# Patient Record
Sex: MALE | Race: WHITE | NOT HISPANIC OR LATINO | Employment: OTHER | URBAN - METROPOLITAN AREA
[De-identification: names, ages, dates, MRNs, and addresses within clinical notes are randomized per-mention and may not be internally consistent; named-entity substitution may affect disease eponyms.]

---

## 2021-04-28 ENCOUNTER — HISTORICAL (OUTPATIENT)
Dept: ADMINISTRATIVE | Facility: HOSPITAL | Age: 76
End: 2021-04-28

## 2021-04-28 LAB
RET# (OHS): 0.07 X10^6/ML (ref 0.03–0.1)
RETICULOCYTE COUNT AUTOMATED (OLG): 1.8 % (ref 1.1–2.1)

## 2021-06-01 ENCOUNTER — HISTORICAL (OUTPATIENT)
Dept: ADMINISTRATIVE | Facility: HOSPITAL | Age: 76
End: 2021-06-01

## 2021-06-01 LAB
RET# (OHS): 0.05 X10^6/ML (ref 0.03–0.1)
RETICULOCYTE COUNT AUTOMATED (OLG): 1.3 % (ref 1.1–2.1)

## 2021-08-02 LAB — CRC RECOMMENDATION EXT: NORMAL

## 2022-01-20 ENCOUNTER — HISTORICAL (OUTPATIENT)
Dept: ADMINISTRATIVE | Facility: HOSPITAL | Age: 77
End: 2022-01-20

## 2022-01-26 ENCOUNTER — HISTORICAL (OUTPATIENT)
Dept: RADIOLOGY | Facility: HOSPITAL | Age: 77
End: 2022-01-26

## 2022-01-31 ENCOUNTER — HISTORICAL (OUTPATIENT)
Dept: RADIOLOGY | Facility: HOSPITAL | Age: 77
End: 2022-01-31

## 2022-01-31 ENCOUNTER — HISTORICAL (OUTPATIENT)
Dept: ADMINISTRATIVE | Facility: HOSPITAL | Age: 77
End: 2022-01-31

## 2022-04-10 ENCOUNTER — HISTORICAL (OUTPATIENT)
Dept: ADMINISTRATIVE | Facility: HOSPITAL | Age: 77
End: 2022-04-10
Payer: MEDICARE

## 2022-04-26 VITALS
OXYGEN SATURATION: 98 % | DIASTOLIC BLOOD PRESSURE: 83 MMHG | WEIGHT: 163.38 LBS | SYSTOLIC BLOOD PRESSURE: 172 MMHG | HEIGHT: 66 IN | BODY MASS INDEX: 26.26 KG/M2

## 2022-06-21 ENCOUNTER — TELEPHONE (OUTPATIENT)
Dept: ORTHOPEDICS | Facility: CLINIC | Age: 77
End: 2022-06-21
Payer: MEDICARE

## 2022-06-21 NOTE — TELEPHONE ENCOUNTER
Spoke to pt and stated  will be out due to a procedure so September is her soonest appt. Pt fell off roof in 2015 had compound fx and had sx, hurts any time he applies pressure. Informed pt to please have his operative note from sx faxed over or bring to appt, pt stated he had xrays done only no CT or MRI. Pt verbalized understanding and is ok with waiting to see .----- Message from Rhina Gibson sent at 6/21/2022  2:20 PM CDT -----  Regarding: Appt  Contact: pt @ [phone]  Pt has appt in Sept, need to be seen asap, had ankle surgery in past, still not able to put pressure on ankle, very painful. Asking for a call back

## 2022-08-24 ENCOUNTER — TELEPHONE (OUTPATIENT)
Dept: ORTHOPEDICS | Facility: CLINIC | Age: 77
End: 2022-08-24
Payer: MEDICARE

## 2022-09-28 DIAGNOSIS — R52 PAIN: Primary | ICD-10-CM

## 2022-10-04 ENCOUNTER — HOSPITAL ENCOUNTER (OUTPATIENT)
Dept: RADIOLOGY | Facility: HOSPITAL | Age: 77
Discharge: HOME | End: 2022-10-04
Attending: ORTHOPAEDIC SURGERY
Payer: MEDICARE

## 2022-10-04 ENCOUNTER — OFFICE VISIT (OUTPATIENT)
Dept: ORTHOPEDICS | Facility: CLINIC | Age: 77
End: 2022-10-04
Payer: MEDICARE

## 2022-10-04 VITALS — BODY MASS INDEX: 27.27 KG/M2 | WEIGHT: 163.69 LBS | HEIGHT: 65 IN

## 2022-10-04 DIAGNOSIS — M19.171 POST-TRAUMATIC OSTEOARTHRITIS OF RIGHT ANKLE: Primary | ICD-10-CM

## 2022-10-04 DIAGNOSIS — Z97.8 ORTHOPEDIC HARDWARE PRESENT: ICD-10-CM

## 2022-10-04 DIAGNOSIS — R52 PAIN: ICD-10-CM

## 2022-10-04 DIAGNOSIS — M79.671 RIGHT FOOT PAIN: ICD-10-CM

## 2022-10-04 DIAGNOSIS — S82.891S OPEN ANKLE FRACTURE, RIGHT, SEQUELA: ICD-10-CM

## 2022-10-04 PROBLEM — M19.071 ARTHRITIS OF RIGHT ANKLE: Status: ACTIVE | Noted: 2022-10-04

## 2022-10-04 PROCEDURE — 99999 PR PBB SHADOW E&M-EST. PATIENT-LVL III: CPT | Mod: PBBFAC,,, | Performed by: ORTHOPAEDIC SURGERY

## 2022-10-04 PROCEDURE — 73610 XR ANKLE COMPLETE 3 VIEW RIGHT: ICD-10-PCS | Mod: 26,RT,, | Performed by: RADIOLOGY

## 2022-10-04 PROCEDURE — 99204 PR OFFICE/OUTPT VISIT, NEW, LEVL IV, 45-59 MIN: ICD-10-PCS | Mod: S$PBB,,, | Performed by: ORTHOPAEDIC SURGERY

## 2022-10-04 PROCEDURE — 99999 PR PBB SHADOW E&M-EST. PATIENT-LVL III: ICD-10-PCS | Mod: PBBFAC,,, | Performed by: ORTHOPAEDIC SURGERY

## 2022-10-04 PROCEDURE — 99204 OFFICE O/P NEW MOD 45 MIN: CPT | Mod: S$PBB,,, | Performed by: ORTHOPAEDIC SURGERY

## 2022-10-04 PROCEDURE — 73630 XR FOOT COMPLETE 3 VIEW RIGHT: ICD-10-PCS | Mod: 26,RT,, | Performed by: RADIOLOGY

## 2022-10-04 PROCEDURE — 73610 X-RAY EXAM OF ANKLE: CPT | Mod: 26,RT,, | Performed by: RADIOLOGY

## 2022-10-04 PROCEDURE — 73610 X-RAY EXAM OF ANKLE: CPT | Mod: TC,PO,RT

## 2022-10-04 PROCEDURE — 99213 OFFICE O/P EST LOW 20 MIN: CPT | Mod: PBBFAC,PO | Performed by: ORTHOPAEDIC SURGERY

## 2022-10-04 PROCEDURE — 73630 X-RAY EXAM OF FOOT: CPT | Mod: TC,PO,RT

## 2022-10-04 PROCEDURE — 73630 X-RAY EXAM OF FOOT: CPT | Mod: 26,RT,, | Performed by: RADIOLOGY

## 2022-10-04 RX ORDER — LEVOTHYROXINE SODIUM 100 UG/1
100 TABLET ORAL
COMMUNITY
End: 2023-10-05 | Stop reason: SDUPTHER

## 2022-10-04 RX ORDER — ATORVASTATIN CALCIUM 20 MG/1
20 TABLET, FILM COATED ORAL DAILY
COMMUNITY
Start: 2022-01-20 | End: 2023-10-05

## 2022-10-04 RX ORDER — AMLODIPINE BESYLATE 5 MG/1
5 TABLET ORAL DAILY
COMMUNITY
Start: 2022-01-20 | End: 2023-10-05 | Stop reason: SDUPTHER

## 2022-10-04 RX ORDER — METFORMIN HYDROCHLORIDE 500 MG/1
500 TABLET ORAL EVERY MORNING
COMMUNITY
Start: 2022-01-20 | End: 2023-10-05 | Stop reason: SDUPTHER

## 2022-10-04 NOTE — PROGRESS NOTES
Hgb A1c 7.1 - patient will need to be counseled cutoff for ankle replacement surgery is <7.  Will need to work with PCP on glycemic control optimization.

## 2022-10-04 NOTE — LETTER
October 4, 2022        Carlos Hutchison Sr., MD  [address]  [place] [address]             [facility]  [address] [place] [address].  [place] [address]  Phone: [phone]   Patient: Jose Cohen   MR Number: [id number]   YOB: 1945   Date of Visit: 10/4/2022       Dear Dr. Hutchison:    I saw your patient, Jose Cohen, today for evaluation. Attached you will find relevant portions of my assessment and plan of care.    Here is some information:  [email or web address]    If you have questions, please do not hesitate to call me. I look forward to following Jose Cohen along with you.    Sincerely,      Natali Cancino MD            CC  No Recipients    Enclosure

## 2022-10-04 NOTE — PATIENT INSTRUCTIONS
"Today, you saw Dr. 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  We discussed options for treatment today include medications, bracing, and surgery.  Surgery options are ankle replacement vs ankle fusion.  We discussed both options and I encourage you to continue research at home.      Benefits of fusion include pain relief and durability.  A fusion cannot "wear out" and you have no permanent restrictions after a fusion heals.  Risks of fusion relates to the bone healing and the long term risk involves stress referred to other joints in the foot which can start to wear out and hurt.  There is also the obvious loss of ankle motion.  However, motion in our "ankle" comes from more places than just the ankle joint and people walk more normal than you would think (https://www.youSocialDiabetes.com/watch?v=zsXPtptmPNM).      Benefits of replacement include pain relief and preserved motion.  Replacement is gaining in popularity and use as great strides have been made in the techniques and the replacement parts available.  Risks of replacement relates to wound healing and infection and the components healing to the bone.  Unique to replacement is the risk of it wearing out - the metal and plastic parts that replace the joint can may not last your lifetime.  For that reason, after a replacement there are certain guidelines I encourage patients to follow to protect/preserve their ankle replacement (avoiding heavy laboring, avoiding running and other higher impact sports).  The younger you are when you have a replacement, the more there is the risk that you outlive the components that were placed.  In that case, you might need another surgery to tweak things or even replace the replacement or convert to a fusion.   This is still an area where our knowledge and experience is growing which is why ankle replacement should be delayed as long as possible.    You can learn more about these options at these " "websites:  --https://blog.[employer].org/articles/ankle-replacement-or-fusion-which-procedure-is-[email or web address]-you  --www.[employer]  --[email or web address]://[email or web address]-[email or web address]  --[email or web address]://[email or web address]-[email or web address]/total-[email or web address]-replacement    We discussed options for treatment of ankle arthritis is/are:  RICE guidelines (see below)  Anti-inflammatory medications (see below)  Therapy: I do not routinely recommend therapy as part of the treatment of arthritis.  I rather recommend you stay active and participate in low impact activities (biking, walking, swimming).  Activity: Use pain as your guide, advance your activities as tolerated   Bracing: none  Injections: Right ankle injections     Thank you for allowing me to participate in your care.      How to treat inflammation?  1.) What does your doctor mean when they tell you to follow R.I.C.E. Guidelines?    Rest your ankle/foot by limiting activities that cause pain.  A good indicator of activity level is your pain the night following the activity and the day after.  If you have pain that lasts until the next day, you did too much.  Ice it to keep the swelling down. Don't put ice directly on the skin (use a thin piece of cloth between the ice bag and skin) and don't ice more than 20 minutes at a time to avoid frostbite.  Compressive bandages immobilize and support your injury.  Make sure it isn't too tight - your toes should not be turning purple and the wrap should not hurt.  Elevate your ankle above your heart level - "toes above your nose". This applies to acute injuries and should be followed for first 48 hours as well as afterward when you have increased pain and swelling after activity or therapy.    2.) Another common way of treating pain and inflammation from an injury is anti-inflammatory medications.  Dr. Cancino may prescribe you a medication for inflammation or you can take an over-the-counter anti-inflammatory " (also known as NSAIDs - nonsteroidal anti-inflammatory drugs).  These include such medications as aleve, motrin, ibuprofen, naproxen, etc.  They should be taken as prescribed or according to the over-the-counter packaging instructions.  These medications can upset the stomach or rare cases causes ulcer disease or kidney injury.  If you are concerned about using these medications long-term, you should discuss it with you primary doctor.  You can also combine or substitute an NSAID with acetaminophen (commonly known as Tylenol).  Take according to bottle instructions, and you can take up to 3000 mg daily (important to keep in mind if you take other medications that contain acetaminophen).  Again long term use should be discussed with your primary doctor.

## 2022-10-05 ENCOUNTER — PATIENT MESSAGE (OUTPATIENT)
Dept: ORTHOPEDICS | Facility: CLINIC | Age: 77
End: 2022-10-05
Payer: MEDICARE

## 2022-10-06 DIAGNOSIS — E11.9 TYPE 2 DIABETES MELLITUS WITHOUT COMPLICATION, WITHOUT LONG-TERM CURRENT USE OF INSULIN: Primary | ICD-10-CM

## 2022-10-13 ENCOUNTER — CLINICAL SUPPORT (OUTPATIENT)
Dept: DIABETES | Facility: CLINIC | Age: 77
End: 2022-10-13
Payer: MEDICARE

## 2022-10-13 VITALS — WEIGHT: 161.69 LBS | BODY MASS INDEX: 26.91 KG/M2

## 2022-10-13 DIAGNOSIS — E11.9 TYPE 2 DIABETES MELLITUS WITHOUT COMPLICATION, WITHOUT LONG-TERM CURRENT USE OF INSULIN: ICD-10-CM

## 2022-10-13 PROCEDURE — G0108 PR DIAB MANAGE TRN  PER INDIV: ICD-10-PCS | Mod: ,,, | Performed by: INTERNAL MEDICINE

## 2022-10-13 PROCEDURE — G0108 DIAB MANAGE TRN  PER INDIV: HCPCS | Mod: ,,, | Performed by: INTERNAL MEDICINE

## 2022-10-13 NOTE — PROGRESS NOTES
Diabetes Care Specialist Progress Note  Author: Amie Blankenship RD  Date: 10/13/2022    Program Intake  Reason for Diabetes Program Visit:: Initial Diabetes Assessment  Current diabetes risk level:: low  In the last 12 months, have you:: none  Permission to speak with others about care:: yes    No results found for: LABA1C, HGBA1C    Clinical    Patient Health Rating  Compared to other people your age, how would you rate your health?: Good    Problem Review  Reviewed health maintenance: yes    Clinical Assessment  Current Diabetes Treatment: Oral Medication (500mg Metformin BID)  Have you ever experienced hypoglycemia (low blood sugar)?: no  Have you ever experienced hyperglycemia (high blood sugar)?: no    Medication Information  How do you obtain your medications?: Family picks up  How many days a week do you miss your medications?: Never  Do you sometimes have difficulty refilling your medications?: No  Medication adherence impacting ability to self-manage diabetes?: No    Labs  Do you have regular lab work to monitor your medications?: Yes  Where do you get your labs drawn?: Other  Lab Compliance Barriers: No    Nutritional Status  Diet: Regular  Meal Plan 24 Hour Recall:  (see care plan)  Change in appetite?: No  Dentation:: Wears Dentures  Recent Changes in Weight: No Recent Weight Change  Current nutritional status an area of need that is impacting patient's ability to self-manage diabetes?: Yes    Additional Social History    Support  Does anyone support you with your diabetes care?: yes  Who supports you?: spouse  Who takes you to your medical appointments?: spouse  Does the current support meet the patient's needs?: Yes  Is Support an area impacting ability to self-manage diabetes?: No         Cognitive/Behavioral Health  Alert and Oriented: Yes  Difficulty Thinking: No  Requires Prompting: No  Requires assistance for routine expression?: No  Cognitive or behavioral barriers impacting ability to self-manage  diabetes?: No    Culture/Samaritan  Culture or Religion beliefs that may impact ability to access healthcare: No    Communication  Language preference: English  Hearing Problems: Yes  Hearing Assistance: Hearing aid  Vision Problems: No  Communication needs impacting ability to self-manage diabetes?: No    Health Literacy  Preferred Learning Method: Face to Face  How often do you need to have someone help you read instructions, pamphlets, or written material from your doctor or pharmacy?: Rarely  Health literacy needs impacting ability to self-manage diabetes?: No      Diabetes Self-Management Skills Assessment    Diabetes Disease Process/Treatment Options  Patient/caregiver able to state what happens when someone has diabetes.: somewhat  Patient/caregiver knows what type of diabetes they have.: yes  Diabetes Type : Type II  Patient able to identify at least three risk factors for diabetes.: yes  Identified risk factors:: age over 40, family history, reduced activity  Diabetes Disease Process/Treatment Options: Skills Assessment Completed: Yes  Assessment indicates:: Adequate understanding  Area of need?: No    Nutrition/Healthy Eating  Method of carbohydrate measurement:: no method  Patient can identify foods that impact blood sugar.: yes  Nutrition/Healthy Eating Skills Assessment Completed:: Yes  Assessment indicates:: Instruction Needed  Area of need?: Yes    Physical Activity/Exercise  Physical Activity/Exercise Skills Assessment Completed: : No  Deffered due to:: Time  Area of need?: Yes    Medications  Patient is able to describe current diabetes management routine.: yes  Diabetes management routine:: oral medications (500mg Metformin BID)  Patient is able to identify current diabetes medications, dosages, and appropriate timing of medications.: yes  Patient understands the purpose of the medications taken for diabetes.: yes  Patient reports problems or concerns with current medication regimen.:  no  Medication Skills Assessment Completed:: Yes  Assessment indicates:: Adequate understanding  Area of need?: No    Home Blood Glucose Monitoring  Patient states that blood sugar is checked at home daily.: yes  Monitoring Method:: home glucometer  How often do you check your blood sugar?: Occasionally  When do you check your blood sugar?: Before breakfast  Blood glucose logs:: no  Blood glucose logs reviewed today?: no  Home Blood Glucose Monitoring Skills Assessment Completed: : Yes  Assessment indicates:: Instruction Needed  Area of need?: Yes    Acute Complications  Acute Complications Skills Assessment Completed: : No  Deffered due to:: Time  Area of need?: Yes    Chronic Complications  Chronic Complications Skills Assessment Completed: : No  Deferred due to:: Time  Area of need?: Yes    Psychosocial/Coping  Patient can identify ways of coping with chronic disease.: yes  Psychosocial/Coping Skills Assessment Completed: : Yes  Area of need?: No      Diabetes Self Support Plan         Assessment Summary and Plan    Based on today's diabetes care assessment, the following areas of need were identified:      Social 10/13/2022   Support No   Cognitive/Behavioral Health No   Culture/Alevism No   Communication No   Health Literacy No        Clinical 10/13/2022   Medication Adherence No   Lab Compliance No   Nutritional Status Yes        Diabetes Self-Management Skills 10/13/2022   Diabetes Disease Process/Treatment Options No   Nutrition/Healthy Eating Yes - see care plan   Physical Activity/Exercise Yes - follow up   Medication No   Home Blood Glucose Monitoring Yes - see care plan   Acute Complications Yes - follow up   Chronic Complications Yes - follow up   Psychosocial/Coping No          Today's interventions were provided through individual discussion, instruction, and written materials were provided.      Patient verbalized understanding of instruction and written materials.  Pt was able to return back  demonstration of instructions today. Patient understood key points, needs reinforcement and further instruction.     Diabetes Self-Management Care Plan:    Today's Diabetes Self-Management Care Plan was developed with Jose's input. Jose has agreed to work toward the following goal(s) to improve his/her overall diabetes control.      Care Plan: Diabetes Management   Updates made since 9/13/2022 12:00 AM        Problem: Healthy Eating         Goal: No more than 30g carbs/meal, 15 g carbs/snack    Start Date: 10/13/2022   Expected End Date: 10/20/2022   This Visit's Progress: On track   Priority: High   Barriers: No Barriers Identified   Note:    Pt current A1C 7.1%.  His surgeon, Dr Cancino, will not perform ankle replacement surgery until <7%.    Before learning this, food recall indicated 3 meals with HS snack daily.  B: 3 or 4 pancakes with syrup, L: sandwich, D: prepared meal.  Evening snack of sweets.  Was drinking lemonade and powerade.  Has been changing his diet for the last week.  B: 2 small pancakes, L: large salad with protein, D: prepared meal.  Has cut out lemonade.  Still some evening snacking and powerade.    Educated pt and wife on carb counting with goal consistency at meals.  No more than 30 g carbs/meal, 15 g carbs/snack with lean protein at each.  Do not drink anything with carbs!       Task: Reviewed the sources and role of Carbohydrate, Protein, and Fat and how each nutrient impacts blood sugar. Completed 10/13/2022        Task: Provided visual examples using dry measuring cups, food models, and other familiar objects such as computer mouse, deck or cards, tennis ball etc. to help with visualization of portions. Completed 10/13/2022        Task: Explained how to count carbohydrates using the food label and the use of dry measuring cups for accurate carb counting. Completed 10/13/2022       Task: Recommended replacing beverages containing high sugar content with noncaloric/sugar free options  and/or water. Completed 10/13/2022       Problem: Blood Glucose Self-Monitoring         Goal: Patient agrees to check and record blood sugars 1  time per day.    Start Date: 10/13/2022   Expected End Date: 10/20/2022   This Visit's Progress: Not met   Priority: Medium   Barriers: No Barriers Identified   Note:    Pt has not been checking BG regularly.  Rec he test fasting BG daily.  BG log and goal ranges provided.                Follow Up Plan     Follow up in about 1 week (around 10/20/2022).    Pt and wife present for appt.  Pt met with HCP on 10/3: A1c 7.1%.  Met with Dr Bar (surgeon to perform ankle replacement) on 10/4; she will not perform surgery until A1C <7%.  Rec pt wife call HCP to make them aware of this.  Very motivated to make changes.  Would like to follow up with me weekly.  This week, focus on diet and BG monitoring.  Will review log at follow up and discuss exercises that can be done from seated position.     Today's care plan and follow up schedule was discussed with patient.  Jose verbalized understanding of the care plan, goals, and agrees to follow up plan.        The patient was encouraged to communicate with his/her health care provider/physician and care team regarding his/her condition(s) and treatment.  I provided the patient with my contact information today and encouraged to contact me via phone or [facility]'s Patient Portal as needed.     Length of Visit   Total Time: 60 Minutes

## 2022-10-20 ENCOUNTER — NUTRITION (OUTPATIENT)
Dept: DIABETES | Facility: CLINIC | Age: 77
End: 2022-10-20
Payer: MEDICARE

## 2022-10-20 VITALS — BODY MASS INDEX: 26.64 KG/M2 | WEIGHT: 160.13 LBS

## 2022-10-20 DIAGNOSIS — E11.9 TYPE 2 DIABETES MELLITUS WITHOUT COMPLICATION, WITHOUT LONG-TERM CURRENT USE OF INSULIN: Primary | ICD-10-CM

## 2022-10-20 PROCEDURE — G0108 PR DIAB MANAGE TRN  PER INDIV: ICD-10-PCS | Mod: ,,, | Performed by: INTERNAL MEDICINE

## 2022-10-20 PROCEDURE — G0108 DIAB MANAGE TRN  PER INDIV: HCPCS | Mod: ,,, | Performed by: INTERNAL MEDICINE

## 2022-10-20 NOTE — PROGRESS NOTES
Diabetes Care Specialist Progress Note  Author: Amie Blankenship RD  Date: 10/20/2022    Program Intake  Reason for Diabetes Program Visit:: Intervention  Type of Intervention:: Individual  Individual: Education  Education: Nutrition and Meal Planning, Self-Management Skill Review  Current diabetes risk level:: low    No results found for: LABA1C, HGBA1C    Clinical                                  Additional Social History                                    Diabetes Self-Management Skills Assessment         Nutrition/Healthy Eating  Nutrition/Healthy Eating Skills Assessment Completed:: Yes  Assessment indicates:: Adequate understanding  Area of need?: No    Physical Activity/Exercise  Patient's daily activity level:: lightly active  Patient formally exercises outside of work.: yes  Exercise Type: other (see comments) (floor exercises)  Intensity: Low  Frequency: four or more times a week  Duration: 15 min  Patient can identify forms of physical activity.: yes  Patient can identify reasons why exercise/physical activity is important in diabetes management.: yes  Physical Activity/Exercise Skills Assessment Completed: : Yes  Assessment indicates:: Adequate understanding  Area of need?: No         Home Blood Glucose Monitoring  Patient states that blood sugar is checked at home daily.: yes  Monitoring Method:: home glucometer  How often do you check your blood sugar?: Once a day  When do you check your blood sugar?: Before breakfast, 2 hours after meal  When you check what is your typical blood sugar range? : see log  Blood glucose logs:: yes  Blood glucose logs reviewed today?: yes  Home Blood Glucose Monitoring Skills Assessment Completed: : Yes  Assessment indicates:: Adequate understanding  Area of need?: No    Acute Complications  Patient is able to identify types of acute complications: Yes  Patient Identified:: Hypoglycemia  Patient is able to state the basic meaning of hypoglycemia?: Yes  Able to state the  blood sugar range for hypoglycemia?: yes  Patient stated range:: <70  Patient can identify general symptoms of hypoglycemia: yes  Able to state proper treatment of hypoglycemia?: yes  Acute Complications Skills Assessment Completed: : Yes  Assessment indicates:: Adequate understanding  Area of need?: No    Chronic Complications  Chronic Complications Skills Assessment Completed: : No  Deferred due to:: Other (comment)  Area of need?: Yes           Diabetes Self Support Plan         Assessment Summary and Plan    Based on today's diabetes care assessment, the following areas of need were identified:      Social 10/13/2022   Support No   Cognitive/Behavioral Health No   Culture/Jain No   Communication No   Health Literacy No        Clinical 10/13/2022   Medication Adherence No   Lab Compliance No   Nutritional Status Yes        Diabetes Self-Management Skills 10/20/2022   Diabetes Disease Process/Treatment Options -   Nutrition/Healthy Eating No   Physical Activity/Exercise No   Medication -   Home Blood Glucose Monitoring No   Acute Complications No   Chronic Complications Yes   Psychosocial/Coping -          Today's interventions were provided through individual discussion, instruction, and written materials were provided.      Patient verbalized understanding of instruction and written materials.  Pt was able to return back demonstration of instructions today. Patient understood key points, needs reinforcement and further instruction.     Diabetes Self-Management Care Plan:    Today's Diabetes Self-Management Care Plan was developed with Jose's input. Jose has agreed to work toward the following goal(s) to improve his/her overall diabetes control.      Care Plan: Diabetes Management   Updates made since 9/20/2022 12:00 AM        Problem: Healthy Eating         Goal: No more than 30g carbs/meal, 15 g carbs/snack Completed 10/20/2022   Start Date: 10/13/2022   Expected End Date: 10/20/2022   Recent Progress:  On track   Priority: High   Barriers: No Barriers Identified   Note:    10/20/22:  Pt doing a great job.  Staying under rec carb ranges.  Did ask if he can occasionally eat ice cream.  If he eats a very low carb meal, he can have 30g carb serving of ice cream for dessert.      Pt current A1C 7.1%.  His surgeon, Dr Cancino, will not perform ankle replacement surgery until <7%.    Before learning this, food recall indicated 3 meals with HS snack daily.  B: 3 or 4 pancakes with syrup, L: sandwich, D: prepared meal.  Evening snack of sweets.  Was drinking lemonade and powerade.  Has been changing his diet for the last week.  B: 2 small pancakes, L: large salad with protein, D: prepared meal.  Has cut out lemonade.  Still some evening snacking and powerade.    Educated pt and wife on carb counting with goal consistency at meals.  No more than 30 g carbs/meal, 15 g carbs/snack with lean protein at each.  Do not drink anything with carbs!       Task: Reviewed the sources and role of Carbohydrate, Protein, and Fat and how each nutrient impacts blood sugar. Completed 10/13/2022        Task: Provided visual examples using dry measuring cups, food models, and other familiar objects such as computer mouse, deck or cards, tennis ball etc. to help with visualization of portions. Completed 10/13/2022        Task: Explained how to count carbohydrates using the food label and the use of dry measuring cups for accurate carb counting. Completed 10/13/2022        Task: Discussed strategies for choosing healthier menu options when dining out. Completed 10/20/2022        Task: Recommended replacing beverages containing high sugar content with noncaloric/sugar free options and/or water. Completed 10/13/2022        Task: Review the importance of balancing carbohydrates with each meal using portion control techniques to count servings of carbohydrate and label reading to identify serving size and amount of total carbs per serving. Completed  10/20/2022        Task: Provided Sample plate method and reviewed the use of the plate to estimate amounts of carbohydrate per meal. Completed 10/20/2022        Problem: Blood Glucose Self-Monitoring         Goal: Patient agrees to check and record blood sugars 1  time per day. Completed 10/20/2022   Start Date: 10/13/2022   Expected End Date: 10/20/2022   This Visit's Progress: Met   Recent Progress: Not met   Priority: Medium   Barriers: No Barriers Identified   Note:    10/20/22:  Pt has been testing BG once daily as advised.  All readings at goal!  See log      Pt has not been checking BG regularly.  Rec he test fasting BG daily.  BG log and goal ranges provided.       Task: Provided patient with a meter today and sent Rx request to provider to send to patients pharmacy. Completed 10/20/2022        Task: Reviewed the importance of self-monitoring blood glucose and keeping logs. Completed 10/20/2022        Task: Instructed on how to self-monitor blood glucose using a home glucometer, how to properly dispose of used strips and lancets after use, and how to appropriately store meter and supplies. Completed 10/20/2022        Task: Provided patient with blood glucose logs, reviewed appropriate timing and frequency to SMBG, education on parameters on when to notify provider and advised patient to bring logs to all appts with PCP/Endocrinologist/Diabetes Care Specialist. Completed 10/20/2022        Task: Discussed ways to minimize pain when monitoring blood glucose. Completed 10/20/2022            Problem: Physical Activity and Exercise         Goal: May start cycling, if cleared by MD; floor or desk exercises most days    Start Date: 10/20/2022   This Visit's Progress: On track   Priority: High   Barriers: No Barriers Identified   Note:    10/20/22:  Since we met last week, pt has been doing floor exercises daily (low impact).  Can not walk long distances, but does have a bike and thinks he should be able to ride it.   Rec he get cleared by HCP before doing so       Problem: Acute Complications         Goal: Patient agrees to identify and manage signs and symptoms of high/low blood sugar (hyper/hypoglycemia) by keeping a log of events and using proper treatment.    Start Date: 10/20/2022   This Visit's Progress: On track   Priority: Medium   Barriers: No Barriers Identified   Note:    10/20/22: Since pt doing such a good job with lifestyle changes and will try to begin incorporating more physical activity, did discuss signs/symptoms of hypoglycemia.  Able to explain proper treatment.        Task: Reviewed proper treatment of hypoglycemia with the rule of 15--patient to eat 15g simple carbohydrate (4 glucose tablets, 1 glucose gel, 5 pieces hard candy, ½ cup fruit juice, ½ can regular soda, etc) and wait 15 minutes and recheck home glucose. Completed 10/20/2022        Task: Reviewed common causes and precautions to help prevent hyper/hypoglycemic events. Completed 10/20/2022        Task: Reviewed signs and symptoms of hyper/hypoglycemia, what range is considered to be hyper/hypoglycemia, and when to seek further medical attention. Completed 10/20/2022         Follow Up Plan     Follow up if symptoms worsen or fail to improve.    Pt is doing a great job!  Following up with HCP on 11/2/22 to discuss possibly drawing another A1C to move forward with surgery clearance.    BG LOG AVAILABLE IN   Did express interest in possibly meeting with me in the future if need for refresher course.  Contact info provided and encouraged them to call any time they need.    Today's care plan and follow up schedule was discussed with patient.  Jose verbalized understanding of the care plan, goals, and agrees to follow up plan.        The patient was encouraged to communicate with his/her health care provider/physician and care team regarding his/her condition(s) and treatment.  I provided the patient with my contact information today  and encouraged to contact me via phone or [facility] Patient Portal as needed.     Length of Visit   Total Time: 30 Minutes

## 2022-11-09 ENCOUNTER — PATIENT MESSAGE (OUTPATIENT)
Dept: ORTHOPEDICS | Facility: CLINIC | Age: 77
End: 2022-11-09
Payer: MEDICARE

## 2022-11-16 ENCOUNTER — TELEPHONE (OUTPATIENT)
Dept: ORTHOPEDICS | Facility: CLINIC | Age: 77
End: 2022-11-16
Payer: MEDICARE

## 2022-11-16 NOTE — TELEPHONE ENCOUNTER
Called patient to discuss:    Waiting on paperwork for A1C    CT scan - will be done in [facility]    Patient wanting to  know about coverage for visit and cost:  Fee for service: [phone]        
Normal rate, regular rhythm.  Heart sounds S1, S2.  No murmurs, rubs or gallops.

## 2022-11-21 ENCOUNTER — PATIENT MESSAGE (OUTPATIENT)
Dept: ORTHOPEDICS | Facility: CLINIC | Age: 77
End: 2022-11-21
Payer: MEDICARE

## 2022-11-28 ENCOUNTER — TELEPHONE (OUTPATIENT)
Dept: ORTHOPEDICS | Facility: CLINIC | Age: 77
End: 2022-11-28
Payer: MEDICARE

## 2022-11-28 DIAGNOSIS — M19.171 POST-TRAUMATIC OSTEOARTHRITIS OF RIGHT ANKLE: Primary | ICD-10-CM

## 2022-11-28 NOTE — TELEPHONE ENCOUNTER
Verbalized the Following:    SCHEDULED Right TAR InBone surgery at [facility], [address] [place] [address] DOS: 2/8/23    Sent message to pre op center for scheduling  confirmed scan date with Vendor  Made pre op appointment.

## 2022-12-05 ENCOUNTER — HOSPITAL ENCOUNTER (OUTPATIENT)
Dept: RADIOLOGY | Facility: HOSPITAL | Age: 77
Discharge: HOME | End: 2022-12-05
Attending: ORTHOPAEDIC SURGERY
Payer: MEDICARE

## 2022-12-05 DIAGNOSIS — M19.171 POST-TRAUMATIC OSTEOARTHRITIS OF RIGHT ANKLE: ICD-10-CM

## 2022-12-05 PROCEDURE — 73700 CT LOWER EXTREMITY W/O DYE: CPT | Mod: TC,RT

## 2022-12-06 ENCOUNTER — PATIENT MESSAGE (OUTPATIENT)
Dept: ORTHOPEDICS | Facility: CLINIC | Age: 77
End: 2022-12-06
Payer: MEDICARE

## 2022-12-19 ENCOUNTER — TELEPHONE (OUTPATIENT)
Dept: PREADMISSION TESTING | Facility: HOSPITAL | Age: 77
End: 2022-12-19
Payer: MEDICARE

## 2022-12-19 NOTE — TELEPHONE ENCOUNTER
----- Message from Basilio Jameson sent at 12/8/2022  3:37 PM CST -----  Regarding: PCP Clearance for Surgical Patient - Dr. Cancino  Hi,     Would you please reach out and schedule PCP clearance appointment with Dr. Prado or either his NPs (Renee Golden & Fredy Byers) for Dr. Cancino surgical patient dos 2/8/2023. Per Dr. Cancino    Thank you so much,    Basilio Jameson, MS, OTC   Sports Medicine Assistant   [facility]

## 2023-01-05 ENCOUNTER — PATIENT MESSAGE (OUTPATIENT)
Dept: ORTHOPEDICS | Facility: CLINIC | Age: 78
End: 2023-01-05
Payer: MEDICARE

## 2023-01-09 ENCOUNTER — TELEPHONE (OUTPATIENT)
Dept: ORTHOPEDICS | Facility: CLINIC | Age: 78
End: 2023-01-09
Payer: MEDICARE

## 2023-01-09 NOTE — TELEPHONE ENCOUNTER
Calling patient regarding message to postpone surgery.     Cancelled all upcoming appts, Notified surgical reps, notified [facility] surgery schedulers to place case into the depot.       Basilio Jameson MS, OTC   Sports Medicine Assistant   [facility]  (P) [phone]  (F) [phone]

## 2023-03-24 LAB — HBA1C MFR BLD: 6.7 %

## 2023-08-15 LAB
LEFT EYE DM RETINOPATHY: NORMAL
RIGHT EYE DM RETINOPATHY: NORMAL

## 2023-09-12 ENCOUNTER — DOCUMENTATION ONLY (OUTPATIENT)
Dept: FAMILY MEDICINE | Facility: CLINIC | Age: 78
End: 2023-09-12
Payer: MEDICARE

## 2023-09-28 DIAGNOSIS — E11.9 DIABETES MELLITUS WITHOUT COMPLICATION: Primary | ICD-10-CM

## 2023-09-28 DIAGNOSIS — F03.90 DEMENTIA WITHOUT BEHAVIORAL DISTURBANCE: ICD-10-CM

## 2023-09-28 DIAGNOSIS — Z79.899 ENCOUNTER FOR LONG-TERM (CURRENT) USE OF OTHER MEDICATIONS: ICD-10-CM

## 2023-09-28 DIAGNOSIS — I10 HYPERTENSION, UNSPECIFIED TYPE: ICD-10-CM

## 2023-09-28 DIAGNOSIS — Z12.5 SPECIAL SCREENING FOR MALIGNANT NEOPLASM OF PROSTATE: ICD-10-CM

## 2023-10-03 ENCOUNTER — TELEPHONE (OUTPATIENT)
Dept: FAMILY MEDICINE | Facility: CLINIC | Age: 78
End: 2023-10-03

## 2023-10-03 NOTE — TELEPHONE ENCOUNTER
----- Message from Jenae Chowdhury sent at 10/3/2023  9:12 AM CDT -----  I CALLED TO CONFIRM APPOINTMENT FOR THURSDAY AND HIS WIFE ALEXANDRA ASKED FOR A NURSE TO CALL HER

## 2023-10-04 LAB
25(OH)D3+25(OH)D2 SERPL-MCNC: 39.1 NG/ML (ref 30–100)
ALBUMIN SERPL-MCNC: 4.5 G/DL (ref 3.8–4.8)
ALP SERPL-CCNC: 93 IU/L (ref 44–121)
ALT SERPL-CCNC: 11 IU/L (ref 0–44)
AST SERPL-CCNC: 16 IU/L (ref 0–40)
BASOPHILS # BLD AUTO: 0.1 X10E3/UL (ref 0–0.2)
BASOPHILS NFR BLD AUTO: 1 %
BILIRUB DIRECT SERPL-MCNC: 0.2 MG/DL (ref 0–0.4)
BILIRUB SERPL-MCNC: 0.7 MG/DL (ref 0–1.2)
BUN SERPL-MCNC: 21 MG/DL (ref 8–27)
BUN/CREAT SERPL: 20 (ref 10–24)
CALCIUM SERPL-MCNC: 9.2 MG/DL (ref 8.6–10.2)
CHLORIDE SERPL-SCNC: 103 MMOL/L (ref 96–106)
CHOLEST SERPL-MCNC: 108 MG/DL (ref 100–199)
CK SERPL-CCNC: 62 U/L (ref 41–331)
CO2 SERPL-SCNC: 23 MMOL/L (ref 20–29)
CREAT SERPL-MCNC: 1.03 MG/DL (ref 0.76–1.27)
EOSINOPHIL # BLD AUTO: 0.4 X10E3/UL (ref 0–0.4)
EOSINOPHIL NFR BLD AUTO: 5 %
ERYTHROCYTE [DISTWIDTH] IN BLOOD BY AUTOMATED COUNT: 13.2 % (ref 11.6–15.4)
EST. GFR  (NO RACE VARIABLE): 75 ML/MIN/1.73
GLUCOSE SERPL-MCNC: 128 MG/DL (ref 70–99)
HBA1C MFR BLD: 6.5 % (ref 4.8–5.6)
HCT VFR BLD AUTO: 37.2 % (ref 37.5–51)
HDLC SERPL-MCNC: 65 MG/DL
HGB BLD-MCNC: 11.7 G/DL (ref 13–17.7)
IMM GRANULOCYTES NFR BLD AUTO: 1 %
LDLC SERPL CALC-MCNC: 30 MG/DL (ref 0–99)
LYMPHOCYTES # BLD AUTO: 1.6 X10E3/UL (ref 0.7–3.1)
LYMPHOCYTES NFR BLD AUTO: 20 %
MCH RBC QN AUTO: 29.7 PG (ref 26.6–33)
MCHC RBC AUTO-ENTMCNC: 31.5 G/DL (ref 31.5–35.7)
MCV RBC AUTO: 94 FL (ref 79–97)
MONOCYTES # BLD AUTO: 0.8 X10E3/UL (ref 0.1–0.9)
MONOCYTES NFR BLD AUTO: 10 %
NEUTROPHILS # BLD AUTO: 5 X10E3/UL (ref 1.4–7)
NEUTROPHILS NFR BLD AUTO: 63 %
PLATELET # BLD AUTO: 268 X10E3/UL (ref 150–450)
POTASSIUM SERPL-SCNC: 4.6 MMOL/L (ref 3.5–5.2)
PROT SERPL-MCNC: 6.7 G/DL (ref 6–8.5)
PSA SERPL-MCNC: <0.1 NG/ML (ref 0–4)
RBC # BLD AUTO: 3.94 X10E6/UL (ref 4.14–5.8)
SODIUM SERPL-SCNC: 139 MMOL/L (ref 134–144)
TRIGL SERPL-MCNC: 59 MG/DL (ref 0–149)
TSH SERPL DL<=0.005 MIU/L-ACNC: 1.56 UIU/ML (ref 0.45–4.5)
URATE SERPL-MCNC: 4.7 MG/DL (ref 3.8–8.4)
VLDLC SERPL CALC-MCNC: 13 MG/DL (ref 5–40)
WBC # BLD AUTO: 7.8 X10E3/UL (ref 3.4–10.8)

## 2023-10-05 ENCOUNTER — OFFICE VISIT (OUTPATIENT)
Dept: FAMILY MEDICINE | Facility: CLINIC | Age: 78
End: 2023-10-05
Payer: MEDICARE

## 2023-10-05 VITALS
HEIGHT: 67 IN | TEMPERATURE: 98 F | DIASTOLIC BLOOD PRESSURE: 70 MMHG | WEIGHT: 156.38 LBS | OXYGEN SATURATION: 100 % | RESPIRATION RATE: 16 BRPM | BODY MASS INDEX: 24.54 KG/M2 | HEART RATE: 104 BPM | SYSTOLIC BLOOD PRESSURE: 120 MMHG

## 2023-10-05 DIAGNOSIS — E11.9 TYPE 2 DIABETES MELLITUS WITHOUT COMPLICATION, WITHOUT LONG-TERM CURRENT USE OF INSULIN: Primary | ICD-10-CM

## 2023-10-05 DIAGNOSIS — I10 PRIMARY HYPERTENSION: ICD-10-CM

## 2023-10-05 DIAGNOSIS — F03.90 DEMENTIA, UNSPECIFIED DEMENTIA SEVERITY, UNSPECIFIED DEMENTIA TYPE, UNSPECIFIED WHETHER BEHAVIORAL, PSYCHOTIC, OR MOOD DISTURBANCE OR ANXIETY: ICD-10-CM

## 2023-10-05 DIAGNOSIS — R41.3 SHORT-TERM MEMORY LOSS: ICD-10-CM

## 2023-10-05 DIAGNOSIS — E03.9 HYPOTHYROIDISM, UNSPECIFIED TYPE: ICD-10-CM

## 2023-10-05 PROBLEM — D64.9 ANEMIA, UNSPECIFIED: Status: ACTIVE | Noted: 2023-10-05

## 2023-10-05 PROBLEM — C44.329 SQUAMOUS CELL CANCER OF SKIN OF LEFT TEMPLE: Status: ACTIVE | Noted: 2023-10-05

## 2023-10-05 PROBLEM — N62 GYNECOMASTIA: Status: ACTIVE | Noted: 2023-10-05

## 2023-10-05 PROBLEM — R97.20 ELEVATED PSA: Status: ACTIVE | Noted: 2023-10-05

## 2023-10-05 PROBLEM — J45.909 ASTHMA: Status: ACTIVE | Noted: 2023-10-05

## 2023-10-05 PROBLEM — Z87.898 HISTORY OF VERTIGO: Status: ACTIVE | Noted: 2023-10-05

## 2023-10-05 PROBLEM — E55.9 VITAMIN D DEFICIENCY: Status: ACTIVE | Noted: 2023-10-05

## 2023-10-05 PROBLEM — H26.9 UNSPECIFIED CATARACT: Status: ACTIVE | Noted: 2023-10-05

## 2023-10-05 PROBLEM — G47.33 OSA (OBSTRUCTIVE SLEEP APNEA): Status: ACTIVE | Noted: 2023-10-05

## 2023-10-05 PROBLEM — R79.82 CRP ELEVATED: Status: ACTIVE | Noted: 2023-10-05

## 2023-10-05 PROBLEM — C61 PROSTATE CANCER: Status: ACTIVE | Noted: 2023-10-05

## 2023-10-05 PROBLEM — M41.9 SCOLIOSIS: Status: ACTIVE | Noted: 2023-10-05

## 2023-10-05 PROBLEM — E29.1 HYPOGONADISM IN MALE: Status: ACTIVE | Noted: 2023-10-05

## 2023-10-05 PROCEDURE — 99214 PR OFFICE/OUTPT VISIT, EST, LEVL IV, 30-39 MIN: ICD-10-PCS | Mod: ,,, | Performed by: FAMILY MEDICINE

## 2023-10-05 PROCEDURE — 99214 OFFICE O/P EST MOD 30 MIN: CPT | Mod: ,,, | Performed by: FAMILY MEDICINE

## 2023-10-05 RX ORDER — LEVOTHYROXINE SODIUM 100 UG/1
100 TABLET ORAL
Qty: 90 TABLET | Refills: 3 | Status: SHIPPED | OUTPATIENT
Start: 2023-10-05

## 2023-10-05 RX ORDER — AMLODIPINE BESYLATE 5 MG/1
5 TABLET ORAL DAILY
Qty: 90 TABLET | Refills: 3 | Status: SHIPPED | OUTPATIENT
Start: 2023-10-05 | End: 2024-02-29 | Stop reason: SDUPTHER

## 2023-10-05 RX ORDER — METFORMIN HYDROCHLORIDE 500 MG/1
500 TABLET ORAL EVERY MORNING
Qty: 90 TABLET | Refills: 3 | Status: SHIPPED | OUTPATIENT
Start: 2023-10-05 | End: 2023-10-30 | Stop reason: SDUPTHER

## 2023-10-05 NOTE — PROGRESS NOTES
Patient Name: Jose Cohen     Patient ID: [id number]     Chief Complaint: Results (Go over lab results.)      HPI:     Jose Cohen is a 77 y.o. male here today for follow up and lab work results. Reviewed and discussed lab work results. Patient's wife reports that patient is having short term memory loss and at times seems confused.    Past Medical History:   Diagnosis Date    Anemia, unspecified     Asthma     CRP elevated     DM (diabetes mellitus)     Elevated PSA     Gynecomastia     History of vertigo     Hypertension     Hypogonadism in male     Hypothyroidism, unspecified     CARMINE (obstructive sleep apnea)     Prostate cancer     Scoliosis     Squamous cell cancer of skin of left temple     Unspecified cataract     Vitamin D deficiency         Past Surgical History:   Procedure Laterality Date    ANKLE FRACTURE SURGERY      CATARACT EXTRACTION      PROSTATE BIOPSY          Social History     Socioeconomic History    Marital status:     Number of children: 1   Tobacco Use    Smoking status: Former     Types: Cigarettes    Smokeless tobacco: Never   Substance and Sexual Activity    Alcohol use: Not Currently    Drug use: Never    Sexual activity: Not Currently        Current Outpatient Medications   Medication Instructions    amLODIPine (NORVASC) 5 mg, Oral, Daily    calcium carbonate (CALCIUM 500 ORAL) 500 mg, Oral, Daily    levothyroxine (SYNTHROID) 100 mcg, Oral, Before breakfast    metFORMIN (GLUCOPHAGE) 500 mg, Oral, Every morning, After breakfast       Review of patient's allergies indicates:   Allergen Reactions    Bactrim [sulfamethoxazole-trimethoprim] Diarrhea        Immunization History   Administered Date(s) Administered    COVID-19, MRNA, LN-S, PF (MODERNA FULL 0.5 ML DOSE) 01/14/2021, 02/11/2021, 09/30/2021, 07/14/2022       Patient Care Team:  Carlos Hutchison Sr., MD as PCP - General (Family Medicine)  Amie Blankenship RD as Dietitian (Diabetes)     Subjective:     Review of  "Systems    10 point review of systems conducted, negative except as stated in the history of present illness. See HPI for details.    Objective:     Visit Vitals  /70 (BP Location: Right arm, Patient Position: Sitting, BP Method: Medium (Manual))   Pulse 104   Temp 98 °F (36.7 °C)   Resp 16   Ht 5' 7" (1.702 m)   Wt 70.9 kg (156 lb 6.4 oz)   SpO2 100%   BMI 24.50 kg/m²       Physical Exam  Constitutional:       Appearance: Normal appearance.   HENT:      Head: Normocephalic and atraumatic.   Cardiovascular:      Rate and Rhythm: Normal rate and regular rhythm.   Pulmonary:      Effort: Pulmonary effort is normal.      Breath sounds: Normal breath sounds.   Abdominal:      Palpations: Abdomen is soft.      Tenderness: There is no abdominal tenderness.   Musculoskeletal:         General: No swelling or tenderness. Normal range of motion.      Cervical back: Normal range of motion and neck supple.      Right lower leg: No edema.      Left lower leg: No edema.   Lymphadenopathy:      Cervical: No cervical adenopathy.   Skin:     General: Skin is warm and dry.   Neurological:      General: No focal deficit present.      Mental Status: He is alert and oriented to person, place, and time.      Cranial Nerves: No cranial nerve deficit.      Sensory: No sensory deficit.      Motor: No weakness.      Comments: Patient's short-term memory recall is poor is quite poor.   Psychiatric:         Mood and Affect: Mood normal.         Behavior: Behavior normal.           Assessment:       ICD-10-CM ICD-9-CM   1. Type 2 diabetes mellitus without complication, without long-term current use of insulin  E11.9 250.00   2. Primary hypertension  I10 401.9   3. Dementia  F03.90 294.20   4. Short-term memory loss  R41.3 780.93   5. Hypothyroidism, unspecified type  E03.9 244.9        Plan:     1. Type 2 diabetes mellitus without complication, without long-term current use of insulin  Overview:  Continue with Metformin 500 mg every " morning.  Follow ADA Diet. Avoid soda, simple sweets, and limit rice/pasta/breads/starches (no more than 45-50 grams per meal).  Maintain healthy weight with goal BMI <30.  Exercise 5 times per week for 30 minutes per day.  Stressed importance of daily foot exams especially if neuropathy is present.  Patient was instructed to notify physician if they notice any sores or skin lesions on the feet.  Stressed the importance of wearing proper fitting comfortable shoes i.e. diabetic footwear.  They were instructed to always wear shoes and never go barefooted.  Stressed importance of annual dilated eye exam.    Assessment & Plan:  Most recent HbA1c 6.5 % on 10/03/2023.  Patient is at goal.    Orders:  -     metFORMIN (GLUCOPHAGE) 500 MG tablet; Take 1 tablet (500 mg total) by mouth every morning. After breakfast  Dispense: 90 tablet; Refill: 3  -     Hemoglobin A1C, POCT; Future; Expected date: 01/05/2024    2. Primary hypertension  Overview:  Continue with Norvasc 5 mg daily.  Low Sodium Diet (DASH Diet - Less than 2 grams of sodium per day).  Monitor blood pressure daily and log. Report consistent numbers greater than 140/90.  Maintain healthy weight with goal BMI <30. Exercise 30 minutes per day, 5 days per week.    Assessment & Plan:  Patient is at goal.    Orders:  -     amLODIPine (NORVASC) 5 MG tablet; Take 1 tablet (5 mg total) by mouth once daily.  Dispense: 90 tablet; Refill: 3    3. Dementia  Overview:  Patient was instructed to engage themselves with light reading, games, crossword puzzles and art.  It was recommended that they remain socially engaged as much as possible.  Medications such as Aricept and Namenda were also discussed.    Assessment & Plan:  Current the patient's wife says he has had memory lapses most of which is short-term memory loss.  He gets confused at times especially while driving.  He has even gotten lost not knowing what direction to go in while driving. According to his wife this has  been going on for quite some time now she just was not ready to address it until now.  I told Mr. Cohen & his wife that he should under no circumstances be driving anymore.  Will refer to Neurology.    Orders:  -     Ambulatory referral/consult to Neurology; Future; Expected date: 10/12/2023    4. Short-term memory loss  Overview:  Patient was instructed to engage themselves with light reading, games, crossword puzzles and art.  It was recommended that they remain socially engaged as much as possible.  Medications such as Aricept and Namenda were also discussed.    Assessment & Plan:  Patient is not well controlled.  Will refer to Neurology.    Orders:  -     Ambulatory referral/consult to Neurology; Future; Expected date: 10/12/2023    5. Hypothyroidism, unspecified type  Overview:  Continue with Levothyroxine 100 mcg daily before breakfast.  Take medicine on an empty stomach with water (no other medications or beverages). Wait 30 minutes to eat or drink.  Report any symptoms of thinning hair, breaking nails, fatigue, weight gain or loss, palpitations.     Assessment & Plan:  Most recent TSH 1.560 uIU/mL on 10/03/2023.  Patient is at goal.    Orders:  -     levothyroxine (SYNTHROID) 100 MCG tablet; Take 1 tablet (100 mcg total) by mouth before breakfast.  Dispense: 90 tablet; Refill: 3        [x] Discussed lab findings with the patient.  [x] Discussed diet, exercise and if appropriate, weight loss.  [x] Instructions and information, including risks and benefits of prescribed medication(s) have been reviewed with the patient and patient verbalizes understanding. Questions have been answered to the patient's satisfaction.  [] Appropriate counseling has been given regarding anxiety and depressive issues that were discussed today.  [] Any lab drawn today will be reviewed by physician at the time it is received and appropriate recommendations bill be made and discussed with patient.     Follow up in about 3 months  (around 1/5/2024) for Diabetes Follow up with HbA1c.   In addition to their scheduled follow up, the patient has also been instructed to follow up on as needed basis.     Future Appointments   Date Time Provider Department Center   1/4/2024  9:30 AM Carlos Hutchison Sr., MD [facility] Jb Bourgeois Sr, MD

## 2023-10-05 NOTE — ASSESSMENT & PLAN NOTE
Current the patient's wife says he has had memory lapses most of which is short-term memory loss.  He gets confused at times especially while driving.  He has even gotten lost not knowing what direction to go in while driving. According to his wife this has been going on for quite some time now she just was not ready to address it until now.  I told Mr. Cohen & his wife that he should under no circumstances be driving anymore.  Will refer to Neurology.

## 2023-10-10 ENCOUNTER — TELEPHONE (OUTPATIENT)
Dept: FAMILY MEDICINE | Facility: CLINIC | Age: 78
End: 2023-10-10
Payer: MEDICARE

## 2023-10-10 NOTE — TELEPHONE ENCOUNTER
----- Message from Jenae Chowdhury sent at 10/9/2023 10:48 AM CDT -----  Regarding: NEURO REFERRAL  MS. ALEXANDRA CALLED ASKING ABOUT MR. GALDAMEZ'S REFERAL

## 2023-10-10 NOTE — TELEPHONE ENCOUNTER
Spoke with spouse of patient in regards to his neurology referral that was sent over and that she should be expecting a call to schedule an appointment to be seen. As well as a MRI of the brain will be done at [facility], and to be expecting a call from them as well.

## 2023-10-19 ENCOUNTER — TELEPHONE (OUTPATIENT)
Dept: FAMILY MEDICINE | Facility: CLINIC | Age: 78
End: 2023-10-19
Payer: MEDICARE

## 2023-10-19 NOTE — TELEPHONE ENCOUNTER
----- Message from Carlos Hutchison Sr., MD sent at 10/18/2023  6:18 PM CDT -----  Call patient's wife Jory her 's MRI is for the most part normal.  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  He has some sinus inflammation noted.  If he is having sinus problems we can phone in an antibiotic.  We can send a copy of his MRI to whatever neurologist he will be seeing.  Otherwise he can consider taking a baby aspirin a day for hardening of the arteries.

## 2023-10-30 ENCOUNTER — TELEPHONE (OUTPATIENT)
Dept: FAMILY MEDICINE | Facility: CLINIC | Age: 78
End: 2023-10-30
Payer: MEDICARE

## 2023-10-30 DIAGNOSIS — E11.9 TYPE 2 DIABETES MELLITUS WITHOUT COMPLICATION, WITHOUT LONG-TERM CURRENT USE OF INSULIN: ICD-10-CM

## 2023-10-30 RX ORDER — METFORMIN HYDROCHLORIDE 500 MG/1
TABLET ORAL
Qty: 180 TABLET | Refills: 2 | Status: SHIPPED | OUTPATIENT
Start: 2023-10-30 | End: 2024-02-29 | Stop reason: SDUPTHER

## 2023-10-30 NOTE — TELEPHONE ENCOUNTER
----- Message from Catia Banks sent at 10/30/2023  2:50 PM CDT -----  Regarding: Metformin  New metformin bottle has different instructions.   Old: bid  New 1 q AM

## 2023-10-30 NOTE — TELEPHONE ENCOUNTER
Patient's wife Mary called stating that they received RX from [facility]  with directions to take Metformin 500 mg 1 daily. Patient should be taking Metformin 500 mg 1 tablet BID PC . It was incorrectly pre charted into the chart at 1 daily. Dr Hutchison notified and reviewed chart and last lab to confirm patient should be on BID per vo Dr Hutchison. Resent new RX to [facility] for Metformin 500 mg 1 tablet BID PC # 180  X 2. Patient notified of the changes. Patient verbalized an understanding.PH

## 2023-12-19 ENCOUNTER — OFFICE VISIT (OUTPATIENT)
Dept: NEUROLOGY | Facility: CLINIC | Age: 78
End: 2023-12-19
Payer: MEDICARE

## 2023-12-19 VITALS
SYSTOLIC BLOOD PRESSURE: 140 MMHG | WEIGHT: 154 LBS | BODY MASS INDEX: 24.17 KG/M2 | HEIGHT: 67 IN | DIASTOLIC BLOOD PRESSURE: 60 MMHG

## 2023-12-19 DIAGNOSIS — R41.3 SHORT-TERM MEMORY LOSS: ICD-10-CM

## 2023-12-19 DIAGNOSIS — F03.90 DEMENTIA, UNSPECIFIED DEMENTIA SEVERITY, UNSPECIFIED DEMENTIA TYPE, UNSPECIFIED WHETHER BEHAVIORAL, PSYCHOTIC, OR MOOD DISTURBANCE OR ANXIETY: ICD-10-CM

## 2023-12-19 PROCEDURE — 99999 PR PBB SHADOW E&M-EST. PATIENT-LVL III: CPT | Mod: PBBFAC,,, | Performed by: NURSE PRACTITIONER

## 2023-12-19 PROCEDURE — 99214 PR OFFICE/OUTPT VISIT, EST, LEVL IV, 30-39 MIN: ICD-10-PCS | Mod: S$PBB,,, | Performed by: NURSE PRACTITIONER

## 2023-12-19 PROCEDURE — 99214 OFFICE O/P EST MOD 30 MIN: CPT | Mod: S$PBB,,, | Performed by: NURSE PRACTITIONER

## 2023-12-19 PROCEDURE — 99213 OFFICE O/P EST LOW 20 MIN: CPT | Mod: PBBFAC | Performed by: NURSE PRACTITIONER

## 2023-12-19 PROCEDURE — 99999 PR PBB SHADOW E&M-EST. PATIENT-LVL III: ICD-10-PCS | Mod: PBBFAC,,, | Performed by: NURSE PRACTITIONER

## 2023-12-19 RX ORDER — ATORVASTATIN CALCIUM 20 MG/1
20 TABLET, FILM COATED ORAL DAILY
COMMUNITY
Start: 2023-11-26 | End: 2024-02-29 | Stop reason: SDUPTHER
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
## 2024-01-04 ENCOUNTER — OFFICE VISIT (OUTPATIENT)
Dept: FAMILY MEDICINE | Facility: CLINIC | Age: 79
End: 2024-01-04
Payer: MEDICARE

## 2024-01-04 VITALS
HEIGHT: 67 IN | BODY MASS INDEX: 24.64 KG/M2 | OXYGEN SATURATION: 97 % | HEART RATE: 73 BPM | SYSTOLIC BLOOD PRESSURE: 125 MMHG | TEMPERATURE: 97 F | RESPIRATION RATE: 17 BRPM | WEIGHT: 157 LBS | DIASTOLIC BLOOD PRESSURE: 70 MMHG

## 2024-01-04 DIAGNOSIS — E11.9 TYPE 2 DIABETES MELLITUS WITHOUT COMPLICATION, WITHOUT LONG-TERM CURRENT USE OF INSULIN: ICD-10-CM

## 2024-01-04 DIAGNOSIS — I10 PRIMARY HYPERTENSION: ICD-10-CM

## 2024-01-04 DIAGNOSIS — I48.0 PAROXYSMAL ATRIAL FIBRILLATION: ICD-10-CM

## 2024-01-04 DIAGNOSIS — F01.50 VASCULAR DEMENTIA, UNSPECIFIED DEMENTIA SEVERITY, UNSPECIFIED WHETHER BEHAVIORAL, PSYCHOTIC, OR MOOD DISTURBANCE OR ANXIETY: Primary | ICD-10-CM

## 2024-01-04 LAB
BILIRUB SERPL-MCNC: NORMAL MG/DL
BLOOD URINE, POC: NORMAL
CLARITY, POC UA: CLEAR
COLOR, POC UA: NORMAL
GLUCOSE UR QL STRIP: NORMAL
HBA1C MFR BLD: 7 %
KETONES UR QL STRIP: NORMAL
LEUKOCYTE ESTERASE URINE, POC: NORMAL
NITRITE, POC UA: NORMAL
PH, POC UA: 6
POC CREATININE: 10 MG/DL (ref 0.6–1.3)
POC MICROALBUMIN URINE: 10
POC MICROALBUMIN/CREATININE RATIO: 30 ΜG/MG (ref 0–30)
PROTEIN, POC: NORMAL
SPECIFIC GRAVITY, POC UA: 1
UROBILINOGEN, POC UA: 0.2

## 2024-01-04 PROCEDURE — 99214 OFFICE O/P EST MOD 30 MIN: CPT | Mod: ,,, | Performed by: FAMILY MEDICINE

## 2024-01-04 PROCEDURE — 82044 UR ALBUMIN SEMIQUANTITATIVE: CPT | Mod: QW,,, | Performed by: FAMILY MEDICINE

## 2024-01-04 PROCEDURE — 83036 HEMOGLOBIN GLYCOSYLATED A1C: CPT | Mod: QW,,, | Performed by: FAMILY MEDICINE

## 2024-01-04 PROCEDURE — 81002 URINALYSIS NONAUTO W/O SCOPE: CPT | Mod: ,,, | Performed by: FAMILY MEDICINE

## 2024-01-04 RX ORDER — ALBUTEROL SULFATE 0.83 MG/ML
2.5 SOLUTION RESPIRATORY (INHALATION)
COMMUNITY
Start: 2023-11-18

## 2024-01-04 RX ORDER — MEMANTINE HYDROCHLORIDE 10 MG/1
10 TABLET ORAL 2 TIMES DAILY
Qty: 60 TABLET | Refills: 11 | Status: SHIPPED | OUTPATIENT
Start: 2024-01-04

## 2024-01-04 RX ORDER — APIXABAN 5 MG/1
5 TABLET, FILM COATED ORAL 2 TIMES DAILY
COMMUNITY
Start: 2024-01-03

## 2024-01-04 NOTE — LETTER
AUTHORIZATION FOR RELEASE OF   CONFIDENTIAL INFORMATION    Dear Dr. Healy's Office,    We are seeing Jose Cohen, date of birth 1945, in the clinic at [facility]. Carlos Hutchison Sr., MD is the patient's PCP. Jose Cohen has an outstanding lab/procedure at the time we reviewed his chart. In order to help keep his health information updated, he has authorized us to request the following medical record(s):        (  )  MAMMOGRAM                                      (  )  COLONOSCOPY      (  )  PAP SMEAR                                          (  )  OUTSIDE LAB RESULTS     (  )  DEXA SCAN                                          ( X )  EYE EXAM            (  )  FOOT EXAM                                          (  )  ENTIRE RECORD     (  )  OUTSIDE IMMUNIZATIONS                 (  )  _______________         Please fax records to [facility], Bourgeois, Leonard Jb. Sr., MD, [phone]     If you have any questions, please contact our office at [phone].           Patient Name: Jose Cohen  : 1945  Patient Phone #: [phone]

## 2024-01-04 NOTE — PROGRESS NOTES
Patient Name: Jose Cohen     Patient ID: [id number]     Chief Complaint: Follow-up (Hemoglobin A1C- 7.0%)      HPI:     Jose Cohen is a 78 y.o. male here today for follow up for Diabetes, Hypertension, Dementia, and HbA1c. Reviewed and discussed HbA1c result.  Patient recently saw a neurologist for his dementia and they recommended he get on Namenda.    Past Medical History:   Diagnosis Date    Anemia, unspecified     CRP elevated     DM (diabetes mellitus)     Elevated PSA     Gynecomastia     History of vertigo     Hypertension     Hypogonadism in male     Hypothyroidism, unspecified     CARMINE (obstructive sleep apnea)     Prostate cancer     Scoliosis     Squamous cell cancer of skin of left temple     Unspecified cataract     Vitamin D deficiency         Past Surgical History:   Procedure Laterality Date    ANKLE FRACTURE SURGERY      CATARACT EXTRACTION      PROSTATE BIOPSY          Social History     Socioeconomic History    Marital status:     Number of children: 1   Tobacco Use    Smoking status: Former     Types: Cigarettes    Smokeless tobacco: Never   Substance and Sexual Activity    Alcohol use: Not Currently    Drug use: Never    Sexual activity: Not Currently     Social Determinants of Health     Financial Resource Strain: Low Risk  (1/4/2024)    Overall Financial Resource Strain (CARDIA)     Difficulty of Paying Living Expenses: Not hard at all   Food Insecurity: No Food Insecurity (1/4/2024)    Hunger Vital Sign     Worried About Running Out of Food in the Last Year: Never true     Ran Out of Food in the Last Year: Never true   Transportation Needs: No Transportation Needs (1/4/2024)    PRAPARE - Transportation     Lack of Transportation (Medical): No     Lack of Transportation (Non-Medical): No   Physical Activity: Insufficiently Active (1/4/2024)    Exercise Vital Sign     Days of Exercise per Week: 5 days     Minutes of Exercise per Session: 20 min   Stress: No Stress Concern Present  (1/4/2024)    [employer] - Occupational Stress Questionnaire     Feeling of Stress : Not at all   Social Connections: Moderately Isolated (1/4/2024)    Social Connection and Isolation Panel [NHANES]     Frequency of Communication with Friends and Family: Three times a week     Frequency of Social Gatherings with Friends and Family: Once a week     Attends Congregation Services: Never     Active Member of Clubs or Organizations: No     Attends Club or Organization Meetings: Never     Marital Status:    Housing Stability: Unknown (1/4/2024)    Housing Stability Vital Sign     Unable to Pay for Housing in the Last Year: No     Unstable Housing in the Last Year: No        Current Outpatient Medications   Medication Instructions    albuterol (PROVENTIL) 2.5 mg, Nebulization, Every 4-6 hours PRN    amLODIPine (NORVASC) 5 mg, Oral, Daily    atorvastatin (LIPITOR) 20 mg, Oral, Daily    calcium carbonate (CALCIUM 500 ORAL) 500 mg, Oral, Daily    ELIQUIS 5 mg, Oral, 2 times daily    levothyroxine (SYNTHROID) 100 mcg, Oral, Before breakfast    memantine (NAMENDA) 10 mg, Oral, 2 times daily    metFORMIN (GLUCOPHAGE) 500 MG tablet TAKE 1 TABLET TWICE A DAY AFTER MEALS       Review of patient's allergies indicates:   Allergen Reactions    Bactrim [sulfamethoxazole-trimethoprim] Diarrhea        Immunization History   Administered Date(s) Administered    COVID-19, MRNA, LN-S, PF (MODERNA FULL 0.5 ML DOSE) 01/14/2021, 02/11/2021, 09/30/2021, 07/14/2022       Patient Care Team:  Carlos Hutchison Sr., MD as PCP - General (Family Medicine)  Amie Blankenship RD as Dietitian (Diabetes)  Collins Healy MD (Ophthalmology)  Drake Blank MD as Resident (Cardiology)  Jose De Jesus Salcedo MD as Consulting Physician (Urology)  Mark Pendleton MD as Consulting Physician (Dermatology)     Subjective:     Review of Systems    10 point review of systems conducted, negative except as stated in the history  "of present illness. See HPI for details.    Objective:     Visit Vitals  /70 (BP Location: Right arm, Patient Position: Sitting, BP Method: Medium (Manual))   Pulse 73   Temp 97 °F (36.1 °C)   Resp 17   Ht 5' 7" (1.702 m)   Wt 71.2 kg (157 lb)   SpO2 97%   BMI 24.59 kg/m²       Physical Exam  Constitutional:       Appearance: Normal appearance.   HENT:      Head: Normocephalic and atraumatic.   Cardiovascular:      Rate and Rhythm: Normal rate and regular rhythm.   Pulmonary:      Effort: Pulmonary effort is normal.      Breath sounds: Normal breath sounds.   Abdominal:      Palpations: Abdomen is soft.      Tenderness: There is no abdominal tenderness.   Musculoskeletal:         General: No swelling or tenderness. Normal range of motion.      Cervical back: Normal range of motion and neck supple.      Right lower leg: No edema.      Left lower leg: No edema.   Lymphadenopathy:      Cervical: No cervical adenopathy.   Skin:     General: Skin is warm and dry.   Neurological:      Mental Status: He is alert and oriented to person, place, and time.      Comments: Patient has short-term memory loss   Psychiatric:         Mood and Affect: Mood normal.         Assessment:       ICD-10-CM ICD-9-CM   1. Vascular dementia, unspecified dementia severity, unspecified whether behavioral, psychotic, or mood disturbance or anxiety  F01.50 290.40   2. Type 2 diabetes mellitus without complication, without long-term current use of insulin  E11.9 250.00   3. Primary hypertension  I10 401.9   4. Paroxysmal atrial fibrillation  I48.0 427.31       Plan:   1. Vascular dementia, unspecified dementia severity, unspecified whether behavioral, psychotic, or mood disturbance or anxiety  Overview:  Followed by Neurology.  Patient was instructed to engage themselves with light reading, games, crossword puzzles and art.  It was recommended that they remain socially engaged as much as possible.  Medications such as Aricept and Namenda were " also discussed.    Assessment & Plan:  Will start Namenda 10 mg nightly for 2 weeks, then increase to BID.    Orders:  -     memantine (NAMENDA) 10 MG Tab; Take 1 tablet (10 mg total) by mouth 2 (two) times daily.  Dispense: 60 tablet; Refill: 11    2. Type 2 diabetes mellitus without complication, without long-term current use of insulin  Overview:  Continue with Metformin 500 mg BID  Diabetes Medications               metFORMIN (GLUCOPHAGE) 500 MG tablet TAKE 1 TABLET TWICE A DAY AFTER MEALS        Follow ADA Diet. Avoid soda, simple sweets, and limit rice/pasta/breads/starches (no more than 45-50 grams per meal).  Maintain healthy weight with goal BMI <30.  Exercise 5 times per week for 30 minutes per day.  Stressed importance of daily foot exams especially if neuropathy is present.  Patient was instructed to notify physician if they notice any sores or skin lesions on the feet.  Stressed the importance of wearing proper fitting comfortable shoes i.e. diabetic footwear.  They were instructed to always wear shoes and never go barefooted.  Stressed importance of annual dilated eye exam.    Assessment & Plan:  Most recent HbA1c today is 7.0 %.  Patient is at goal.       Orders:  -     POCT HEMOGLOBIN A1C  -     POCT URINE DIPSTICK WITHOUT MICROSCOPE  -     POCT Microalbumin/Creatinine Ratio  -     Hemoglobin A1C, POCT; Future; Expected date: 04/04/2024    3. Primary hypertension  Overview:  Hypertension Medications               amLODIPine (NORVASC) 5 MG tablet Take 1 tablet (5 mg total) by mouth once daily.        Low Sodium Diet (DASH Diet - Less than 2 grams of sodium per day).  Monitor blood pressure daily and log. Report consistent numbers greater than 140/90.  Maintain healthy weight with goal BMI <30. Exercise 30 minutes per day, 5 days per week.    Assessment & Plan:  BP Readings from Last 1 Encounters:   01/04/24 125/70   Patient is at goal.       4. Paroxysmal atrial fibrillation  Comments:  From my  examination patient appears to be in a normal sinus rhythm at this time.  Overview:  Presently on Eliquis 5 mg BID as prescribed by Cardiology.  Patient is anticoagulated for stroke prevention. He was instructed to report to the nearest emergency room for any chest pain ,shortness of breath or elevated heart rate i.e. greater than 100.  It was recommended that he stay away from all stimulant medication including over-the-counter cough and cold preparations that contain Sudafed. He was also warned against excessive caffeine intake. Patient is presently being followed by Cardiology.     Assessment & Plan:  Patient is well controlled.          [x] Discussed lab findings with the patient.  [x] Discussed diet, exercise and if appropriate, weight loss.  [x] Instructions and information, including risks and benefits of prescribed medication(s) have been reviewed with the patient and patient verbalizes understanding. Questions have been answered to the patient's satisfaction.  [] Appropriate counseling has been given regarding anxiety and depressive issues that were discussed today.  [] Any lab drawn today will be reviewed by physician at the time it is received and appropriate recommendations bill be made and discussed with patient.     Follow up in about 3 months (around 4/4/2024) for Diabetes Follow up with HbA1c.   In addition to their scheduled follow up, the patient has also been instructed to follow up on as needed basis.     Future Appointments   Date Time Provider Department Center   4/3/2024  9:30 AM Carlos Hutchison Sr., MD [facility] Leonard Jb Bourgeois Sr, MD

## 2024-01-17 ENCOUNTER — DOCUMENTATION ONLY (OUTPATIENT)
Dept: FAMILY MEDICINE | Facility: CLINIC | Age: 79
End: 2024-01-17
Payer: MEDICARE

## 2024-02-28 ENCOUNTER — TELEPHONE (OUTPATIENT)
Dept: FAMILY MEDICINE | Facility: CLINIC | Age: 79
End: 2024-02-28
Payer: MEDICARE

## 2024-02-28 DIAGNOSIS — E78.5 HYPERLIPIDEMIA, UNSPECIFIED HYPERLIPIDEMIA TYPE: Primary | ICD-10-CM

## 2024-02-28 DIAGNOSIS — E11.9 TYPE 2 DIABETES MELLITUS WITHOUT COMPLICATION, WITHOUT LONG-TERM CURRENT USE OF INSULIN: ICD-10-CM

## 2024-02-28 DIAGNOSIS — I10 PRIMARY HYPERTENSION: ICD-10-CM

## 2024-02-28 NOTE — TELEPHONE ENCOUNTER
----- Message from Catia Banks sent at 2/28/2024 11:19 AM CST -----  Regarding: Refill  Delores,   Mr MERCEDES would like for you to call him.  He needs cholesterol medicine sent to [facility]

## 2024-02-29 DIAGNOSIS — F03.90 DEMENTIA WITHOUT BEHAVIORAL DISTURBANCE: ICD-10-CM

## 2024-02-29 DIAGNOSIS — R41.3 SHORT-TERM MEMORY LOSS: ICD-10-CM

## 2024-02-29 DIAGNOSIS — F01.50 VASCULAR DEMENTIA, UNSPECIFIED DEMENTIA SEVERITY, UNSPECIFIED WHETHER BEHAVIORAL, PSYCHOTIC, OR MOOD DISTURBANCE OR ANXIETY: Primary | ICD-10-CM

## 2024-02-29 RX ORDER — ATORVASTATIN CALCIUM 20 MG/1
20 TABLET, FILM COATED ORAL DAILY
Qty: 90 TABLET | Refills: 1 | Status: SHIPPED | OUTPATIENT
Start: 2024-02-29

## 2024-02-29 RX ORDER — AMLODIPINE BESYLATE 5 MG/1
5 TABLET ORAL DAILY
Qty: 90 TABLET | Refills: 1 | Status: SHIPPED | OUTPATIENT
Start: 2024-02-29

## 2024-02-29 RX ORDER — METFORMIN HYDROCHLORIDE 500 MG/1
TABLET ORAL
Qty: 180 TABLET | Refills: 1 | Status: SHIPPED | OUTPATIENT
Start: 2024-02-29 | End: 2024-04-03 | Stop reason: DRUGHIGH

## 2024-02-29 NOTE — TELEPHONE ENCOUNTER
Mary patient wife called along with Mr Cohen besides her requesting they want a second opinion regarding his STML and dementia. Was not satisfied with Dr in [place].Dr Hutchison notified and approved 2nd opinion request . Referral sent to  Dr Sterling  in [place], [place]. Mary notified of referral being faxed over today and to expect a call within a week or so.She verbalized an understanding.

## 2024-04-01 DIAGNOSIS — E11.9 TYPE 2 DIABETES MELLITUS WITHOUT COMPLICATION, WITHOUT LONG-TERM CURRENT USE OF INSULIN: Primary | ICD-10-CM

## 2024-04-01 DIAGNOSIS — E78.5 HYPERLIPIDEMIA, UNSPECIFIED HYPERLIPIDEMIA TYPE: ICD-10-CM

## 2024-04-01 DIAGNOSIS — E03.9 HYPOTHYROIDISM, UNSPECIFIED TYPE: ICD-10-CM

## 2024-04-01 DIAGNOSIS — Z79.899 ENCOUNTER FOR LONG-TERM (CURRENT) USE OF OTHER MEDICATIONS: ICD-10-CM

## 2024-04-03 ENCOUNTER — OFFICE VISIT (OUTPATIENT)
Dept: FAMILY MEDICINE | Facility: CLINIC | Age: 79
End: 2024-04-03
Payer: MEDICARE

## 2024-04-03 VITALS
SYSTOLIC BLOOD PRESSURE: 130 MMHG | BODY MASS INDEX: 25.74 KG/M2 | WEIGHT: 164 LBS | HEART RATE: 73 BPM | HEIGHT: 67 IN | TEMPERATURE: 98 F | OXYGEN SATURATION: 96 % | DIASTOLIC BLOOD PRESSURE: 65 MMHG | RESPIRATION RATE: 17 BRPM

## 2024-04-03 DIAGNOSIS — E55.9 VITAMIN D DEFICIENCY: ICD-10-CM

## 2024-04-03 DIAGNOSIS — E11.9 TYPE 2 DIABETES MELLITUS WITHOUT COMPLICATION, WITHOUT LONG-TERM CURRENT USE OF INSULIN: Primary | ICD-10-CM

## 2024-04-03 DIAGNOSIS — R41.3 SHORT-TERM MEMORY LOSS: ICD-10-CM

## 2024-04-03 DIAGNOSIS — G47.33 OSA (OBSTRUCTIVE SLEEP APNEA): ICD-10-CM

## 2024-04-03 DIAGNOSIS — E03.9 HYPOTHYROIDISM, UNSPECIFIED TYPE: ICD-10-CM

## 2024-04-03 DIAGNOSIS — E78.2 MIXED HYPERLIPIDEMIA: ICD-10-CM

## 2024-04-03 DIAGNOSIS — I10 PRIMARY HYPERTENSION: ICD-10-CM

## 2024-04-03 DIAGNOSIS — D64.9 ANEMIA, UNSPECIFIED TYPE: ICD-10-CM

## 2024-04-03 LAB
25(OH)D3+25(OH)D2 SERPL-MCNC: 35.1 NG/ML (ref 30–100)
ALBUMIN SERPL-MCNC: 4.2 G/DL (ref 3.8–4.8)
ALBUMIN/GLOB SERPL: 1.8 {RATIO} (ref 1.2–2.2)
ALP SERPL-CCNC: 98 IU/L (ref 44–121)
ALT SERPL-CCNC: 14 IU/L (ref 0–44)
AST SERPL-CCNC: 18 IU/L (ref 0–40)
BASOPHILS # BLD AUTO: 0.1 X10E3/UL (ref 0–0.2)
BASOPHILS NFR BLD AUTO: 1 %
BILIRUB SERPL-MCNC: 0.8 MG/DL (ref 0–1.2)
BUN SERPL-MCNC: 28 MG/DL (ref 8–27)
BUN/CREAT SERPL: 23 (ref 10–24)
CALCIUM SERPL-MCNC: 9.6 MG/DL (ref 8.6–10.2)
CHLORIDE SERPL-SCNC: 106 MMOL/L (ref 96–106)
CHOLEST SERPL-MCNC: 125 MG/DL (ref 100–199)
CK SERPL-CCNC: 92 U/L (ref 41–331)
CO2 SERPL-SCNC: 20 MMOL/L (ref 20–29)
CREAT SERPL-MCNC: 1.22 MG/DL (ref 0.76–1.27)
EOSINOPHIL # BLD AUTO: 0.4 X10E3/UL (ref 0–0.4)
EOSINOPHIL NFR BLD AUTO: 5 %
ERYTHROCYTE [DISTWIDTH] IN BLOOD BY AUTOMATED COUNT: 13.4 % (ref 11.6–15.4)
EST. GFR  (NO RACE VARIABLE): 61 ML/MIN/1.73
GLOBULIN SER CALC-MCNC: 2.4 G/DL (ref 1.5–4.5)
GLUCOSE SERPL-MCNC: 136 MG/DL (ref 70–99)
HBA1C MFR BLD: 7 % (ref 4.8–5.6)
HCT VFR BLD AUTO: 37 % (ref 37.5–51)
HDLC SERPL-MCNC: 69 MG/DL
HGB BLD-MCNC: 11.8 G/DL (ref 13–17.7)
IMM GRANULOCYTES NFR BLD AUTO: 0 %
LDLC SERPL CALC-MCNC: 42 MG/DL (ref 0–99)
LYMPHOCYTES # BLD AUTO: 1.6 X10E3/UL (ref 0.7–3.1)
LYMPHOCYTES NFR BLD AUTO: 21 %
MCH RBC QN AUTO: 29.9 PG (ref 26.6–33)
MCHC RBC AUTO-ENTMCNC: 31.9 G/DL (ref 31.5–35.7)
MCV RBC AUTO: 94 FL (ref 79–97)
MONOCYTES # BLD AUTO: 0.8 X10E3/UL (ref 0.1–0.9)
MONOCYTES NFR BLD AUTO: 10 %
NEUTROPHILS # BLD AUTO: 4.8 X10E3/UL (ref 1.4–7)
NEUTROPHILS NFR BLD AUTO: 63 %
PLATELET # BLD AUTO: 254 X10E3/UL (ref 150–450)
POTASSIUM SERPL-SCNC: 4.5 MMOL/L (ref 3.5–5.2)
PROT SERPL-MCNC: 6.6 G/DL (ref 6–8.5)
RBC # BLD AUTO: 3.94 X10E6/UL (ref 4.14–5.8)
SODIUM SERPL-SCNC: 141 MMOL/L (ref 134–144)
TRIGL SERPL-MCNC: 70 MG/DL (ref 0–149)
TSH SERPL DL<=0.005 MIU/L-ACNC: 2 UIU/ML (ref 0.45–4.5)
URATE SERPL-MCNC: 5.6 MG/DL (ref 3.8–8.4)
VLDLC SERPL CALC-MCNC: 14 MG/DL (ref 5–40)
WBC # BLD AUTO: 7.6 X10E3/UL (ref 3.4–10.8)

## 2024-04-03 PROCEDURE — 99214 OFFICE O/P EST MOD 30 MIN: CPT | Mod: ,,, | Performed by: FAMILY MEDICINE

## 2024-04-03 RX ORDER — METFORMIN HYDROCHLORIDE 500 MG/1
TABLET ORAL
Qty: 180 TABLET | Refills: 5 | Status: SHIPPED | OUTPATIENT
Start: 2024-04-03

## 2024-04-03 RX ORDER — LANOLIN ALCOHOL/MO/W.PET/CERES
1000 CREAM (GRAM) TOPICAL DAILY
COMMUNITY

## 2024-04-03 NOTE — ASSESSMENT & PLAN NOTE
Patient's last TSH was 2.0 on 04/02/2024.  Patient's hypothyroidism is well controlled and at goal.

## 2024-04-03 NOTE — ASSESSMENT & PLAN NOTE
Patient was recently changed from CPAP to BiPAP in order to accommodate a higher pressure.  Pt. reports he does feel better since using his BIPAP

## 2024-04-03 NOTE — ASSESSMENT & PLAN NOTE
I instructed the patient to continue with his combination vitamin-D and calcium product and also take a separate vitamin-D of 3000 units daily.  Patient voiced a clear understanding.

## 2024-04-03 NOTE — PROGRESS NOTES
Patient Name: Jose Cohen     Patient ID: [id number]     Chief Complaint: Follow-up (Hemoglobin A1C- 7.0)      HPI:     Jose Cohen is a 78 y.o. male here today for follow up on diabetes mellitus, anemia,  hypothyroidism, hypertension, vitamin D deficiency and review of lab results.    Past Medical History:   Diagnosis Date    Anemia, unspecified     CRP elevated     DM (diabetes mellitus)     Elevated PSA     Gynecomastia     History of vertigo     Hypertension     Hypogonadism in male     Hypothyroidism, unspecified     CARMINE (obstructive sleep apnea)     Prostate cancer     Scoliosis     Squamous cell cancer of skin of left temple     Unspecified cataract     Vitamin D deficiency         Past Surgical History:   Procedure Laterality Date    ANKLE FRACTURE SURGERY      CATARACT EXTRACTION      PROSTATE BIOPSY          Social History     Socioeconomic History    Marital status:     Number of children: 1   Tobacco Use    Smoking status: Former     Types: Cigarettes    Smokeless tobacco: Never   Substance and Sexual Activity    Alcohol use: Not Currently    Drug use: Never    Sexual activity: Not Currently     Social Determinants of Health     Financial Resource Strain: Low Risk  (1/4/2024)    Overall Financial Resource Strain (CARDIA)     Difficulty of Paying Living Expenses: Not hard at all   Food Insecurity: No Food Insecurity (1/4/2024)    Hunger Vital Sign     Worried About Running Out of Food in the Last Year: Never true     Ran Out of Food in the Last Year: Never true   Transportation Needs: No Transportation Needs (1/4/2024)    PRAPARE - Transportation     Lack of Transportation (Medical): No     Lack of Transportation (Non-Medical): No   Physical Activity: Insufficiently Active (1/4/2024)    Exercise Vital Sign     Days of Exercise per Week: 5 days     Minutes of Exercise per Session: 20 min   Stress: No Stress Concern Present (1/4/2024)    [employer] - Occupational  Stress Questionnaire     Feeling of Stress : Not at all   Social Connections: Moderately Isolated (1/4/2024)    Social Connection and Isolation Panel [NHANES]     Frequency of Communication with Friends and Family: Three times a week     Frequency of Social Gatherings with Friends and Family: Once a week     Attends [employer] Services: Never     Active Member of Clubs or Organizations: No     Attends Club or Organization Meetings: Never     Marital Status:    Housing Stability: Unknown (1/4/2024)    Housing Stability Vital Sign     Unable to Pay for Housing in the Last Year: No     Unstable Housing in the Last Year: No        Current Outpatient Medications   Medication Instructions    albuterol (PROVENTIL) 2.5 mg, Nebulization, Every 4-6 hours PRN    amLODIPine (NORVASC) 5 mg, Oral, Daily    atorvastatin (LIPITOR) 20 mg, Oral, Daily    calcium carbonate (CALCIUM 500 ORAL) 500 mg, Oral, Daily    calcium carbonate/vitamin D3 (VITAMIN D-3 ORAL) Oral, Daily    cyanocobalamin (VITAMIN B-12) 1,000 mcg, Oral, Daily, Sublingual tablet daily.    ELIQUIS 5 mg, Oral, 2 times daily    levothyroxine (SYNTHROID) 100 mcg, Oral, Before breakfast    MAGNESIUM L-THREONATE ORAL Oral, Daily    memantine (NAMENDA) 10 mg, Oral, 2 times daily       Review of patient's allergies indicates:   Allergen Reactions    Bactrim [sulfamethoxazole-trimethoprim] Diarrhea        Immunization History   Administered Date(s) Administered    COVID-19, MRNA, LN-S, PF (MODERNA FULL 0.5 ML DOSE) 01/14/2021, 02/11/2021, 09/30/2021, 07/14/2022    Influenza (FLUAD) - Quadrivalent - Adjuvanted - PF *Preferred* (65+) 12/01/2023    Zoster Recombinant 01/19/2023, 03/22/2023       Patient Care Team:  Carlos Hutchison Sr., MD as PCP - General (Family Medicine)  Amie Blankenship RD as Dietitian (Diabetes)  Collins Healy MD (Ophthalmology)  Drake Blank MD as Resident (Cardiology)  Jose De Jesus Salcedo MD as Consulting Physician (Urology)  Keerthi  "Mark MOORE MD as Consulting Physician (Dermatology)  Nelson Sterling Jr., MD as Consulting Physician (Neurology)  Loan Buenrostro MD (Otolaryngology)     Subjective:     Review of Systems    10 point review of systems conducted, negative except as stated in the history of present illness. See HPI for details.    Objective:     Visit Vitals  /65 (BP Location: Right arm, Patient Position: Sitting, BP Method: Medium (Manual))   Pulse 73   Temp 97.6 °F (36.4 °C)   Resp 17   Ht 5' 7" (1.702 m)   Wt 74.4 kg (164 lb)   SpO2 96%   BMI 25.69 kg/m²       Physical Exam  Constitutional:       Appearance: Normal appearance.   HENT:      Head: Normocephalic and atraumatic.   Cardiovascular:      Rate and Rhythm: Normal rate and regular rhythm.   Pulmonary:      Effort: Pulmonary effort is normal.      Breath sounds: Normal breath sounds.   Abdominal:      Palpations: Abdomen is soft.      Tenderness: There is no abdominal tenderness.   Musculoskeletal:         General: No swelling or tenderness. Normal range of motion.      Cervical back: Normal range of motion and neck supple.      Right lower leg: No edema.      Left lower leg: No edema.   Lymphadenopathy:      Cervical: No cervical adenopathy.   Skin:     General: Skin is warm and dry.   Neurological:      General: No focal deficit present.      Mental Status: He is alert and oriented to person, place, and time.   Psychiatric:         Mood and Affect: Mood normal.         Assessment:       ICD-10-CM ICD-9-CM   1. Type 2 diabetes mellitus without complication, without long-term current use of insulin  E11.9 250.00   2. Primary hypertension  I10 401.9   3. Mixed hyperlipidemia  E78.2 272.2   4. Short-term memory loss  R41.3 780.93   5. Hypothyroidism, unspecified type  E03.9 244.9   6. Anemia, unspecified type  D64.9 285.9   7. CARMINE (obstructive sleep apnea)  G47.33 327.23   8. Vitamin D deficiency  E55.9 268.9       Plan:   1. Type 2 diabetes mellitus without " complication, without long-term current use of insulin  Overview:  Current Medication:c50; Metformin 500 mg BID  Diabetes Medications               metFORMIN (GLUCOPHAGE) 500 MG tablet TAKE 1 TABLET TWICE A DAY AFTER MEALS        Follow ADA Diet. Avoid soda, simple sweets, and limit rice/pasta/breads/starches (no more than 45-50 grams per meal).  Maintain healthy weight with goal BMI <30.  Exercise 5 times per week for 30 minutes per day.  Stressed importance of daily foot exams especially if neuropathy is present.  Patient was instructed to notify physician if they notice any sores or skin lesions on the feet.  Stressed the importance of wearing proper fitting comfortable shoes i.e. diabetic footwear.  They were instructed to always wear shoes and never go barefooted.  Stressed importance of annual dilated eye exam.    Assessment & Plan:  Lab Results   Component Value Date    HGBA1C 7.0 (H) 04/02/2024    HGBA1C 6.7 03/24/2023     Patient is not at goal. Increased Metformin 500mg one po q am pc breakfast and two po q pm after supper.      2. Primary hypertension  Overview:  Hypertension Medications               amLODIPine (NORVASC) 5 MG tablet Take 1 tablet (5 mg total) by mouth once daily.        Low Sodium Diet (DASH Diet - Less than 2 grams of sodium per day).  Monitor blood pressure daily and log. Report consistent numbers greater than 140/90.  Maintain healthy weight with goal BMI <30. Exercise 30 minutes per day, 5 days per week.    Assessment & Plan:  Blood pressure is well controlled and at goal. He continues to be monitored by cardiology.      3. Mixed hyperlipidemia  Overview:  Current med:  Atorvastatin 20 mg daily.   Stressed importance of dietary modifications. Follow a low cholesterol, low saturated fat diet with less that 200mg of cholesterol a day.  Avoid fried foods and high saturated fats (high saturated fats less than 7% of calories).  Add Flax Seed/Fish Oil supplements to diet. Increase dietary  fiber.  Regular exercise can reduce LDL and raise HDL. Stressed importance of physical activity 5 times per week for 30 minutes per day.      Assessment & Plan:  Patient's last LDL was 42 on 04/02/2024.  Patient's hyperlipidemia is well controlled and at goal.      4. Short-term memory loss  Overview:  Current Medication: Namenda 10 mg one po BID.  Patient was instructed to engage themselves with light reading, games, crossword puzzles and art.  It was recommended that they remain socially engaged as much as possible.  Medications such as Aricept and Namenda were also discussed.    Assessment & Plan:  Pt is undergoing a work up by neurologist.      5. Hypothyroidism, unspecified type  Overview:  Current Medication: Levothyroxine 100 mcg daily before breakfast.  Take medicine on an empty stomach with water (no other medications or beverages). Wait 30 minutes to eat or drink.  Report any symptoms of thinning hair, breaking nails, fatigue, weight gain or loss, palpitations.     Assessment & Plan:  Patient's last TSH was 2.0 on 04/02/2024.  Patient's hypothyroidism is well controlled and at goal.      6. Anemia, unspecified type  Overview:  Patient has a history of mild normocytic anemia.    Assessment & Plan:  Patient's last H&H was 11.8 and 37.0 respectively with an MCV of 94.  His H&H is stable.  Patient had a B12 level done on 03/07/2024 and it returned low normal at 346.   B12 1000 mcg SL daily added to medication regime.      7. CARMINE (obstructive sleep apnea)  Overview:  Wears CPAP/BiPAP and reports compliance nightly. Life time use expected.  Has experienced improved daytime fatigue.  Avoid excessive alcohol, smoking and overuse of sedatives at bedtime.  Weight management discussed. Goal BMI <30.  Adjust sleep position PRN.     Assessment & Plan:  Patient was recently changed from CPAP to BiPAP in order to accommodate a higher pressure.  Pt. reports he does feel better since using his BIPAP      8. Vitamin D  deficiency  Overview:  Patient was taking a combination vitamin-D - calcium product.      Assessment & Plan:  I instructed the patient to continue with his combination vitamin-D and calcium product and also take a separate vitamin-D of 3000 units daily.  Patient voiced a clear understanding.          [x] Discussed lab findings with the patient.  [x] Discussed diet, exercise and if appropriate, weight loss.  [x] Instructions and information, including risks and benefits of prescribed medication(s) have been reviewed with the patient and patient verbalizes understanding. Questions have been answered to the patient's satisfaction.  [] Appropriate counseling has been given regarding anxiety and depressive issues that were discussed today.  [] Any lab drawn today will be reviewed by physician at the time it is received and appropriate recommendations bill be made and discussed with patient.     Follow up in about 3 months (around 7/3/2024) for With Fasting Labs prior to visit.   In addition to their scheduled follow up, the patient has also been instructed to follow up on as needed basis.     Future Appointments   Date Time Provider Department Center   7/1/2024  2:00 PM LAB, [facility]   7/11/2024  1:30 PM Carlos Hutchison Sr., MD [facility] Jb Bourgeois Sr, MD

## 2024-04-03 NOTE — ASSESSMENT & PLAN NOTE
Lab Results   Component Value Date    HGBA1C 7.0 (H) 04/02/2024    HGBA1C 6.7 03/24/2023     Patient is not at goal. Increased Metformin 500mg one po q am pc breakfast and two po q pm after supper.

## 2024-04-03 NOTE — ASSESSMENT & PLAN NOTE
Patient's last LDL was 42 on 04/02/2024.  Patient's hyperlipidemia is well controlled and at goal.

## 2024-04-03 NOTE — ASSESSMENT & PLAN NOTE
Patient's last H&H was 11.8 and 37.0 respectively with an MCV of 94.  His H&H is stable.  Patient had a B12 level done on 03/07/2024 and it returned low normal at 346.   B12 1000 mcg SL daily added to medication regime.

## 2024-07-08 LAB
BASOPHILS # BLD AUTO: 0.1 X10E3/UL (ref 0–0.2)
BASOPHILS NFR BLD AUTO: 1 %
BUN SERPL-MCNC: 27 MG/DL (ref 8–27)
BUN/CREAT SERPL: 26 (ref 10–24)
CALCIUM SERPL-MCNC: 9.8 MG/DL (ref 8.6–10.2)
CHLORIDE SERPL-SCNC: 103 MMOL/L (ref 96–106)
CO2 SERPL-SCNC: 23 MMOL/L (ref 20–29)
CREAT SERPL-MCNC: 1.05 MG/DL (ref 0.76–1.27)
EOSINOPHIL # BLD AUTO: 0.4 X10E3/UL (ref 0–0.4)
EOSINOPHIL NFR BLD AUTO: 5 %
ERYTHROCYTE [DISTWIDTH] IN BLOOD BY AUTOMATED COUNT: 13.6 % (ref 11.6–15.4)
EST. AVERAGE GLUCOSE BLD GHB EST-MCNC: 137 MG/DL
EST. GFR  (NO RACE VARIABLE): 73 ML/MIN/1.73
GLUCOSE SERPL-MCNC: 118 MG/DL (ref 70–99)
HBA1C MFR BLD: 6.4 % (ref 4.8–5.6)
HCT VFR BLD AUTO: 37.9 % (ref 37.5–51)
HGB BLD-MCNC: 11.9 G/DL (ref 13–17.7)
IMM GRANULOCYTES NFR BLD AUTO: 1 %
LYMPHOCYTES # BLD AUTO: 1.6 X10E3/UL (ref 0.7–3.1)
LYMPHOCYTES NFR BLD AUTO: 19 %
MCH RBC QN AUTO: 29.8 PG (ref 26.6–33)
MCHC RBC AUTO-ENTMCNC: 31.4 G/DL (ref 31.5–35.7)
MCV RBC AUTO: 95 FL (ref 79–97)
MONOCYTES # BLD AUTO: 0.8 X10E3/UL (ref 0.1–0.9)
MONOCYTES NFR BLD AUTO: 10 %
NEUTROPHILS # BLD AUTO: 5.5 X10E3/UL (ref 1.4–7)
NEUTROPHILS NFR BLD AUTO: 64 %
PLATELET # BLD AUTO: 243 X10E3/UL (ref 150–450)
POTASSIUM SERPL-SCNC: 4.7 MMOL/L (ref 3.5–5.2)
RBC # BLD AUTO: 3.99 X10E6/UL (ref 4.14–5.8)
SODIUM SERPL-SCNC: 140 MMOL/L (ref 134–144)
WBC # BLD AUTO: 8.5 X10E3/UL (ref 3.4–10.8)

## 2024-07-26 DIAGNOSIS — E11.9 TYPE 2 DIABETES MELLITUS WITHOUT COMPLICATION, WITHOUT LONG-TERM CURRENT USE OF INSULIN: ICD-10-CM

## 2024-07-26 RX ORDER — METFORMIN HYDROCHLORIDE 500 MG/1
TABLET ORAL
Qty: 180 TABLET | Refills: 5 | Status: SHIPPED | OUTPATIENT
Start: 2024-07-26

## 2024-08-05 DIAGNOSIS — E03.9 HYPOTHYROIDISM, UNSPECIFIED TYPE: ICD-10-CM

## 2024-08-05 RX ORDER — LEVOTHYROXINE SODIUM 100 UG/1
100 TABLET ORAL
Qty: 90 TABLET | Refills: 0 | Status: SHIPPED | OUTPATIENT
Start: 2024-08-05 | End: 2024-08-05

## 2024-08-05 RX ORDER — LEVOTHYROXINE SODIUM 100 UG/1
100 TABLET ORAL
Qty: 90 TABLET | Refills: 0 | Status: SHIPPED | OUTPATIENT
Start: 2024-08-05 | End: 2024-08-05 | Stop reason: SDUPTHER

## 2024-08-08 NOTE — PROGRESS NOTES
Family Medicine    Patient ID: [id number]     Chief Complaint: Follow-up (Lab results ) and Medication Refill (Amlodipine 5mg/Atorvastatin 20 mg/Levothyroxine 100 mg )      HPI:     Jose Cohen is a 78 y.o. male here today for a follow-up.  We also spent a considerable amount of time discussing the patient's dementia with the patient's wife who had several questions about dementia stemming from a 2nd opinion neurology note which she brought with her today    Past Medical History:   Diagnosis Date    Anemia, unspecified     CRP elevated     DM (diabetes mellitus)     Elevated PSA     Gynecomastia     History of vertigo     Hypertension     Hypogonadism in male     Hypothyroidism, unspecified     CARMINE (obstructive sleep apnea)     Prostate cancer     Scoliosis     Squamous cell cancer of skin of left temple     Unspecified cataract     Vitamin D deficiency         Past Surgical History:   Procedure Laterality Date    ANKLE FRACTURE SURGERY      CATARACT EXTRACTION      PROSTATE BIOPSY          Social History     Tobacco Use    Smoking status: Former     Types: Cigarettes    Smokeless tobacco: Never   Substance and Sexual Activity    Alcohol use: Not Currently    Drug use: Never    Sexual activity: Not Currently        Current Outpatient Medications   Medication Instructions    albuterol (PROVENTIL) 2.5 mg, Nebulization, Every 4-6 hours PRN    amLODIPine (NORVASC) 5 mg, Oral, Daily    atorvastatin (LIPITOR) 20 mg, Oral, Daily    calcium carbonate (CALCIUM 500 ORAL) 500 mg, Oral, Daily    calcium carbonate/vitamin D3 (VITAMIN D-3 ORAL) Oral, Daily    cyanocobalamin (VITAMIN B-12) 1,000 mcg, Oral, Daily, Sublingual tablet daily.    donepeziL (ARICEPT) 5 mg, Oral, Daily    ELIQUIS 5 mg, Oral, 2 times daily    levothyroxine (SYNTHROID) 100 mcg, Oral, Before breakfast    MAGNESIUM L-THREONATE ORAL Oral, Daily    memantine (NAMENDA) 10 mg, Oral, 2 times daily    metFORMIN (GLUCOPHAGE) 500 MG tablet 1 q am after breakfast   "and 2 q pm after supper       Review of patient's allergies indicates:   Allergen Reactions    Bactrim [sulfamethoxazole-trimethoprim] Diarrhea        Patient Care Team:  Carlos Hutchison Sr., MD as PCP - General (Family Medicine)  Amie Blankenship RD as Dietitian (Diabetes)  Collins Healy MD (Ophthalmology)  Drake Blank MD as Resident (Cardiology)  Jose De Jesus Salcedo MD as Consulting Physician (Urology)  Mark Pendleton MD as Consulting Physician (Dermatology)  Nelson Sterling Jr., MD as Consulting Physician (Neurology)  Loan Buenrostro MD (Otolaryngology)     Subjective:     Review of Systems    12 point review of systems conducted, negative except as stated in the history of present illness. See HPI for details.    Objective:     Visit Vitals  /66 (BP Location: Left arm, Patient Position: Sitting)   Pulse 71   Temp 97.9 °F (36.6 °C)   Resp 18   Ht 5' 7" (1.702 m)   Wt 72.3 kg (159 lb 6.4 oz)   SpO2 96%   BMI 24.97 kg/m²       Physical Exam    Labs Reviewed:     Chemistry:  Lab Results   Component Value Date     07/08/2024    K 4.7 07/08/2024    BUN 27 07/08/2024    CREATININE 1.05 07/08/2024    EGFRNORACEVR 73 07/08/2024    CALCIUM 9.8 07/08/2024    ALBUMIN 4.2 04/02/2024    BILIDIR 0.20 10/03/2023    AST 18 04/02/2024    ALT 14 04/02/2024    OCARTCBQ84TG 35.1 04/02/2024    TSH 2.000 04/02/2024        Lab Results   Component Value Date    HGBA1C 6.4 (H) 07/08/2024        Hematology:  Lab Results   Component Value Date    WBC 8.5 07/08/2024    HGB 11.9 (L) 07/08/2024    HCT 37.9 07/08/2024     07/08/2024       Lipid Panel:  Lab Results   Component Value Date    CHOL 125 04/02/2024    HDL 69 04/02/2024    TRIG 70 04/02/2024        Urine:  No results found for: "COLORUA", "APPEARANCEUA", "SGUA", "PHUA", "PROTEINUA", "GLUCOSEUA", "KETONESUA", "BLOODUA", "NITRITESUA", "LEUKOCYTESUR", "RBCUA", "WBCUA", "BACTERIA", "SQEPUA", "HYALINECASTS", "CREATRANDUR", "PROTEINURINE", " ""UPROTCREA"     Assessment:       ICD-10-CM ICD-9-CM   1. Type 2 diabetes mellitus without complication, without long-term current use of insulin  E11.9 250.00   2. Anemia, unspecified type  D64.9 285.9   3. Hyperlipidemia, unspecified hyperlipidemia type  E78.5 272.4   4. Primary hypertension  I10 401.9   5. Hypothyroidism, unspecified type  E03.9 244.9   6. Mixed hyperlipidemia  E78.2 272.2   7. Prostate cancer  C61 185   8. Encounter for screening for malignant neoplasm of prostate  Z12.5 V76.44        Plan:     1. Type 2 diabetes mellitus without complication, without long-term current use of insulin  Overview:  Metformin 1000 mg a.m., 500 mg p.m.    Assessment & Plan:  Lab Results   Component Value Date    HGBA1C 6.4 (H) 07/08/2024    HGBA1C 6.7 03/24/2023     At goal   Continue above medication at same dose      2. Anemia, unspecified type  Overview:  Patient has a history of mild normocytic anemia.    Assessment & Plan:  Improving anemia   Repeat CBC three-months      3. Hyperlipidemia, unspecified hyperlipidemia type  -     atorvastatin (LIPITOR) 20 MG tablet; Take 1 tablet (20 mg total) by mouth once daily.  Dispense: 90 tablet; Refill: 1    4. Primary hypertension  Overview:  Hypertension Medications               amLODIPine (NORVASC) 5 MG tablet Take 1 tablet (5 mg total) by mouth once daily.        Low Sodium Diet (DASH Diet - Less than 2 grams of sodium per day).  Monitor blood pressure daily and log. Report consistent numbers greater than 140/90.  Maintain healthy weight with goal BMI <30. Exercise 30 minutes per day, 5 days per week.    Assessment & Plan:  At goal   Continue above medication at same dose    Orders:  -     amLODIPine (NORVASC) 5 MG tablet; Take 1 tablet (5 mg total) by mouth once daily.  Dispense: 90 tablet; Refill: 1    5. Hypothyroidism, unspecified type  Overview:  Current Medication: Levothyroxine 100 mcg daily before breakfast.  Take medicine on an empty stomach with water (no " other medications or beverages). Wait 30 minutes to eat or drink.  Report any symptoms of thinning hair, breaking nails, fatigue, weight gain or loss, palpitations.     Assessment & Plan:  At goal   Continue above medication at same dose    Orders:  -     levothyroxine (SYNTHROID) 100 MCG tablet; Take 1 tablet (100 mcg total) by mouth before breakfast.  Dispense: 90 tablet; Refill: 3    6. Mixed hyperlipidemia  Overview:  Current med:  Atorvastatin 20 mg daily.   Stressed importance of dietary modifications. Follow a low cholesterol, low saturated fat diet with less that 200mg of cholesterol a day.  Avoid fried foods and high saturated fats (high saturated fats less than 7% of calories).  Add Flax Seed/Fish Oil supplements to diet. Increase dietary fiber.  Regular exercise can reduce LDL and raise HDL. Stressed importance of physical activity 5 times per week for 30 minutes per day.      Assessment & Plan:  At goal   Continue above medication at same dose      7. Prostate cancer  -     PSA, Screening; Future; Expected date: 08/14/2024    8. Encounter for screening for malignant neoplasm of prostate  -     PSA, Screening; Future; Expected date: 08/14/2024      The vast majority of the visit was spent discussing patient's vascular dementia.  Apparently patient's neurologist told him not to drive.  They got a 2nd opinion on this and the 2nd opinion doctor said that he should not drive for now, but that he should have his CPAP adjusted and his cognition should be re-evaluated after that.  They are now awaiting CPAP adjustment.    Patient also showed me the 2nd medical opinion letter which said that patient is PSA was substantially elevated.  We are not sure where this comes from because the last 1 on our record shows that it is negligible.  At an abundance of caution we will get a PSA today and let them know.      We also had a rg discussion with patient directly inform him of his diagnosis of vascular dementia which  he claimed not know about.  We also specifically told him he should not be driving right now, and may never be able to drive again, again depending on the CPAP adjustment and subsequent cognitive re-evaluation                      Declines all vaccines    No follow-ups on file. In addition to their scheduled follow up, the patient has also been instructed to follow up on as needed basis.     No future appointments.       Dakota Vickers MD

## 2024-08-08 NOTE — ASSESSMENT & PLAN NOTE
Lab Results   Component Value Date    HGBA1C 6.4 (H) 07/08/2024    HGBA1C 6.7 03/24/2023     At goal   Continue above medication at same dose

## 2024-08-14 ENCOUNTER — OFFICE VISIT (OUTPATIENT)
Dept: FAMILY MEDICINE | Facility: CLINIC | Age: 79
End: 2024-08-14
Payer: MEDICARE

## 2024-08-14 VITALS
TEMPERATURE: 98 F | SYSTOLIC BLOOD PRESSURE: 135 MMHG | OXYGEN SATURATION: 96 % | RESPIRATION RATE: 18 BRPM | HEART RATE: 71 BPM | DIASTOLIC BLOOD PRESSURE: 66 MMHG | HEIGHT: 67 IN | BODY MASS INDEX: 25.01 KG/M2 | WEIGHT: 159.38 LBS

## 2024-08-14 DIAGNOSIS — E78.5 HYPERLIPIDEMIA, UNSPECIFIED HYPERLIPIDEMIA TYPE: ICD-10-CM

## 2024-08-14 DIAGNOSIS — D64.9 ANEMIA, UNSPECIFIED TYPE: ICD-10-CM

## 2024-08-14 DIAGNOSIS — E11.9 TYPE 2 DIABETES MELLITUS WITHOUT COMPLICATION, WITHOUT LONG-TERM CURRENT USE OF INSULIN: Primary | ICD-10-CM

## 2024-08-14 DIAGNOSIS — C61 PROSTATE CANCER: ICD-10-CM

## 2024-08-14 DIAGNOSIS — Z12.5 ENCOUNTER FOR SCREENING FOR MALIGNANT NEOPLASM OF PROSTATE: ICD-10-CM

## 2024-08-14 DIAGNOSIS — I10 PRIMARY HYPERTENSION: ICD-10-CM

## 2024-08-14 DIAGNOSIS — E78.2 MIXED HYPERLIPIDEMIA: ICD-10-CM

## 2024-08-14 DIAGNOSIS — E03.9 HYPOTHYROIDISM, UNSPECIFIED TYPE: ICD-10-CM

## 2024-08-14 PROCEDURE — 99213 OFFICE O/P EST LOW 20 MIN: CPT | Mod: ,,, | Performed by: FAMILY MEDICINE

## 2024-08-14 PROCEDURE — 84153 ASSAY OF PSA TOTAL: CPT | Performed by: FAMILY MEDICINE

## 2024-08-14 RX ORDER — ATORVASTATIN CALCIUM 20 MG/1
20 TABLET, FILM COATED ORAL DAILY
Qty: 90 TABLET | Refills: 1 | Status: SHIPPED | OUTPATIENT
Start: 2024-08-14 | End: 2024-08-15 | Stop reason: SDUPTHER

## 2024-08-14 RX ORDER — LEVOTHYROXINE SODIUM 100 UG/1
100 TABLET ORAL
Qty: 90 TABLET | Refills: 3 | Status: SHIPPED | OUTPATIENT
Start: 2024-08-14 | End: 2024-08-15 | Stop reason: SDUPTHER

## 2024-08-14 RX ORDER — AMLODIPINE BESYLATE 5 MG/1
5 TABLET ORAL DAILY
Qty: 90 TABLET | Refills: 1 | Status: SHIPPED | OUTPATIENT
Start: 2024-08-14 | End: 2024-08-15 | Stop reason: SDUPTHER

## 2024-08-14 RX ORDER — DONEPEZIL HYDROCHLORIDE 5 MG/1
5 TABLET, FILM COATED ORAL DAILY
COMMUNITY
Start: 2024-07-26

## 2024-08-15 DIAGNOSIS — I10 PRIMARY HYPERTENSION: ICD-10-CM

## 2024-08-15 DIAGNOSIS — E03.9 HYPOTHYROIDISM, UNSPECIFIED TYPE: ICD-10-CM

## 2024-08-15 DIAGNOSIS — E78.5 HYPERLIPIDEMIA, UNSPECIFIED HYPERLIPIDEMIA TYPE: ICD-10-CM

## 2024-08-15 LAB — PSA SERPL-MCNC: <0.1 NG/ML

## 2024-08-15 RX ORDER — AMLODIPINE BESYLATE 5 MG/1
5 TABLET ORAL DAILY
Qty: 90 TABLET | Refills: 1 | Status: SHIPPED | OUTPATIENT
Start: 2024-08-15

## 2024-08-15 RX ORDER — LEVOTHYROXINE SODIUM 100 UG/1
100 TABLET ORAL
Qty: 90 TABLET | Refills: 3 | Status: SHIPPED | OUTPATIENT
Start: 2024-08-15

## 2024-08-15 RX ORDER — ATORVASTATIN CALCIUM 20 MG/1
20 TABLET, FILM COATED ORAL DAILY
Qty: 90 TABLET | Refills: 1 | Status: SHIPPED | OUTPATIENT
Start: 2024-08-15

## 2024-09-04 LAB
LEFT EYE DM RETINOPATHY: NORMAL
RIGHT EYE DM RETINOPATHY: NORMAL

## 2024-09-10 ENCOUNTER — DOCUMENTATION ONLY (OUTPATIENT)
Dept: FAMILY MEDICINE | Facility: CLINIC | Age: 79
End: 2024-09-10
Payer: MEDICARE